# Patient Record
Sex: MALE | Race: WHITE | NOT HISPANIC OR LATINO | Employment: UNEMPLOYED | URBAN - METROPOLITAN AREA
[De-identification: names, ages, dates, MRNs, and addresses within clinical notes are randomized per-mention and may not be internally consistent; named-entity substitution may affect disease eponyms.]

---

## 2018-02-22 ENCOUNTER — APPOINTMENT (EMERGENCY)
Dept: CT IMAGING | Facility: HOSPITAL | Age: 53
End: 2018-02-22
Payer: COMMERCIAL

## 2018-02-22 ENCOUNTER — HOSPITAL ENCOUNTER (EMERGENCY)
Facility: HOSPITAL | Age: 53
Discharge: HOME | End: 2018-02-22
Attending: EMERGENCY MEDICINE | Admitting: EMERGENCY MEDICINE
Payer: COMMERCIAL

## 2018-02-22 VITALS
DIASTOLIC BLOOD PRESSURE: 83 MMHG | TEMPERATURE: 98.4 F | SYSTOLIC BLOOD PRESSURE: 143 MMHG | WEIGHT: 295 LBS | BODY MASS INDEX: 37.86 KG/M2 | HEIGHT: 74 IN | HEART RATE: 77 BPM | RESPIRATION RATE: 20 BRPM | OXYGEN SATURATION: 100 %

## 2018-02-22 DIAGNOSIS — R91.1 PULMONARY NODULE: ICD-10-CM

## 2018-02-22 DIAGNOSIS — R07.9 CHEST PAIN: Primary | ICD-10-CM

## 2018-02-22 DIAGNOSIS — M54.6 THORACIC BACK PAIN: ICD-10-CM

## 2018-02-22 DIAGNOSIS — F41.9 ANXIETY: ICD-10-CM

## 2018-02-22 DIAGNOSIS — F43.9 STRESS AT HOME: ICD-10-CM

## 2018-02-22 LAB
ALBUMIN SERPL BCP-MCNC: 3.9 G/DL (ref 3.5–5)
ALP SERPL-CCNC: 70 U/L (ref 46–116)
ALT SERPL W P-5'-P-CCNC: 49 U/L (ref 12–78)
ANION GAP SERPL CALCULATED.3IONS-SCNC: 12 MMOL/L (ref 4–13)
APTT PPP: 32 SECONDS (ref 23–35)
AST SERPL W P-5'-P-CCNC: 21 U/L (ref 5–45)
ATRIAL RATE: 74 BPM
BASOPHILS # BLD AUTO: 0.13 THOUSANDS/ΜL (ref 0–0.1)
BASOPHILS NFR BLD AUTO: 1 % (ref 0–1)
BILIRUB SERPL-MCNC: 0.4 MG/DL (ref 0.2–1)
BILIRUB UR QL STRIP: NEGATIVE
BUN SERPL-MCNC: 13 MG/DL (ref 5–25)
CALCIUM SERPL-MCNC: 9.2 MG/DL (ref 8.3–10.1)
CHLORIDE SERPL-SCNC: 103 MMOL/L (ref 100–108)
CLARITY UR: CLEAR
CO2 SERPL-SCNC: 24 MMOL/L (ref 21–32)
COLOR UR: COLORLESS
CREAT SERPL-MCNC: 1.05 MG/DL (ref 0.6–1.3)
EOSINOPHIL # BLD AUTO: 0.29 THOUSAND/ΜL (ref 0–0.61)
EOSINOPHIL NFR BLD AUTO: 2 % (ref 0–6)
ERYTHROCYTE [DISTWIDTH] IN BLOOD BY AUTOMATED COUNT: 12.1 % (ref 11.6–15.1)
GFR SERPL CREATININE-BSD FRML MDRD: 81 ML/MIN/1.73SQ M
GLUCOSE SERPL-MCNC: 105 MG/DL (ref 65–140)
GLUCOSE UR STRIP-MCNC: NEGATIVE MG/DL
HCT VFR BLD AUTO: 47.7 % (ref 36.5–49.3)
HGB BLD-MCNC: 16.6 G/DL (ref 12–17)
HGB UR QL STRIP.AUTO: NEGATIVE
INR PPP: 0.99 (ref 0.86–1.16)
KETONES UR STRIP-MCNC: NEGATIVE MG/DL
LEUKOCYTE ESTERASE UR QL STRIP: NEGATIVE
LIPASE SERPL-CCNC: 78 U/L (ref 73–393)
LYMPHOCYTES # BLD AUTO: 1.64 THOUSANDS/ΜL (ref 0.6–4.47)
LYMPHOCYTES NFR BLD AUTO: 11 % (ref 14–44)
MAGNESIUM SERPL-MCNC: 2.3 MG/DL (ref 1.6–2.6)
MCH RBC QN AUTO: 30 PG (ref 26.8–34.3)
MCHC RBC AUTO-ENTMCNC: 34.8 G/DL (ref 31.4–37.4)
MCV RBC AUTO: 86 FL (ref 82–98)
MONOCYTES # BLD AUTO: 0.86 THOUSAND/ΜL (ref 0.17–1.22)
MONOCYTES NFR BLD AUTO: 6 % (ref 4–12)
NEUTROPHILS # BLD AUTO: 11.89 THOUSANDS/ΜL (ref 1.85–7.62)
NEUTS SEG NFR BLD AUTO: 79 % (ref 43–75)
NITRITE UR QL STRIP: NEGATIVE
NRBC BLD AUTO-RTO: 0 /100 WBCS
P AXIS: 58 DEGREES
PH UR STRIP.AUTO: 6 [PH] (ref 4.5–8)
PLATELET # BLD AUTO: 354 THOUSANDS/UL (ref 149–390)
PMV BLD AUTO: 9.8 FL (ref 8.9–12.7)
POTASSIUM SERPL-SCNC: 4 MMOL/L (ref 3.5–5.3)
PR INTERVAL: 134 MS
PROT SERPL-MCNC: 7.3 G/DL (ref 6.4–8.2)
PROT UR STRIP-MCNC: NEGATIVE MG/DL
PROTHROMBIN TIME: 13.3 SECONDS (ref 12.1–14.4)
QRS AXIS: -25 DEGREES
QRSD INTERVAL: 94 MS
QT INTERVAL: 400 MS
QTC INTERVAL: 444 MS
RBC # BLD AUTO: 5.54 MILLION/UL (ref 3.88–5.62)
SODIUM SERPL-SCNC: 139 MMOL/L (ref 136–145)
SP GR UR STRIP.AUTO: <=1.005 (ref 1–1.03)
T WAVE AXIS: 52 DEGREES
TROPONIN I SERPL-MCNC: <0.02 NG/ML
TROPONIN I SERPL-MCNC: <0.02 NG/ML
UROBILINOGEN UR QL STRIP.AUTO: 0.2 E.U./DL
VENTRICULAR RATE: 74 BPM
WBC # BLD AUTO: 15.06 THOUSAND/UL (ref 4.31–10.16)

## 2018-02-22 PROCEDURE — 85730 THROMBOPLASTIN TIME PARTIAL: CPT | Performed by: EMERGENCY MEDICINE

## 2018-02-22 PROCEDURE — 83735 ASSAY OF MAGNESIUM: CPT | Performed by: EMERGENCY MEDICINE

## 2018-02-22 PROCEDURE — 99285 EMERGENCY DEPT VISIT HI MDM: CPT

## 2018-02-22 PROCEDURE — 85025 COMPLETE CBC W/AUTO DIFF WBC: CPT | Performed by: EMERGENCY MEDICINE

## 2018-02-22 PROCEDURE — 80053 COMPREHEN METABOLIC PANEL: CPT | Performed by: EMERGENCY MEDICINE

## 2018-02-22 PROCEDURE — 93010 ELECTROCARDIOGRAM REPORT: CPT | Performed by: INTERNAL MEDICINE

## 2018-02-22 PROCEDURE — 74175 CTA ABDOMEN W/CONTRAST: CPT

## 2018-02-22 PROCEDURE — 81003 URINALYSIS AUTO W/O SCOPE: CPT | Performed by: EMERGENCY MEDICINE

## 2018-02-22 PROCEDURE — 36415 COLL VENOUS BLD VENIPUNCTURE: CPT | Performed by: EMERGENCY MEDICINE

## 2018-02-22 PROCEDURE — 93005 ELECTROCARDIOGRAM TRACING: CPT

## 2018-02-22 PROCEDURE — 71275 CT ANGIOGRAPHY CHEST: CPT

## 2018-02-22 PROCEDURE — 84484 ASSAY OF TROPONIN QUANT: CPT | Performed by: EMERGENCY MEDICINE

## 2018-02-22 PROCEDURE — 83690 ASSAY OF LIPASE: CPT | Performed by: EMERGENCY MEDICINE

## 2018-02-22 PROCEDURE — 85610 PROTHROMBIN TIME: CPT | Performed by: EMERGENCY MEDICINE

## 2018-02-22 PROCEDURE — 96360 HYDRATION IV INFUSION INIT: CPT

## 2018-02-22 RX ORDER — NAPROXEN 500 MG/1
500 TABLET ORAL EVERY 12 HOURS PRN
Qty: 20 TABLET | Refills: 0 | Status: SHIPPED | OUTPATIENT
Start: 2018-02-22

## 2018-02-22 RX ORDER — ALPRAZOLAM 0.5 MG/1
0.5 TABLET ORAL ONCE
Status: COMPLETED | OUTPATIENT
Start: 2018-02-22 | End: 2018-02-22

## 2018-02-22 RX ORDER — ALBUTEROL SULFATE 90 UG/1
2 AEROSOL, METERED RESPIRATORY (INHALATION) EVERY 6 HOURS
COMMUNITY
Start: 2017-12-11

## 2018-02-22 RX ADMIN — SODIUM CHLORIDE 1000 ML: 0.9 INJECTION, SOLUTION INTRAVENOUS at 17:38

## 2018-02-22 RX ADMIN — ALPRAZOLAM 0.5 MG: 0.5 TABLET ORAL at 20:23

## 2018-02-22 RX ADMIN — IOHEXOL 100 ML: 350 INJECTION, SOLUTION INTRAVENOUS at 18:55

## 2018-02-22 NOTE — ED PROVIDER NOTES
History  Chief Complaint   Patient presents with    Chest Pain     Pt with left sided chest pain, pt actually walked into [facility]  Pt took one aspirin on his own, patient with no pain at this time  Pt does have a hx of anxiety     Patient is a 64-year-old male with past medical history of GERD, Khanna's esophagus and generalized anxiety disorder, presents to the emergency department for chest pain that he feels is secondary to stress and anxiety  Patient states he has been under great deal of stress at home, primarily because of his relationship with his wife  He is currently unemployed and relies solely on his wife for transportation and financial support  He states today he developed bilateral thoracic back pain that started to migrate into the center of his chest   He describes the pain as an ache as well as a pinching sensation  He decided to take 325 mg of aspirin and drove himself to the local EMS station which was only a mile away  His blood pressure was noted to be very elevated so they recommended he come to the ED for evaluation  He reports the chest pain has subsided but he does report a sharp sensation/pain in the left thoracic back  He denies having any associated symptoms other than the chest pain  On review of systems, he does admit to chronic sinus issues as well as cough for the past several days  He has a nebulizer machine that he uses as needed and did use it this morning  He also admits to chronic right-sided tinnitus which she has had extensive workup with a neurologist for  He denies diaphoresis, fever, chills, headache, dizziness or near syncope, hemoptysis, visual disturbance, change in hearing, palpitations, abdominal pain, nausea, vomiting, diarrhea, constipation, blood per rectum or melena, urinary symptoms, skin rash or color change, extremity swelling or pain, extremity weakness or paresthesia or other focal neurologic deficits    He reports having had a stress test about 10 years ago which was unremarkable  He has a family doctor  He states other than the stress and anxiety does not feel overly depressed however when he is speaking about his wife he is tearful on exam   He denies any suicidal homicidal ideation  He feels safe going home with his wife  He has a therapist and a psychiatrist and last saw the therapist yesterday  History provided by:  Patient   used: No    Chest Pain   Associated symptoms: back pain and cough    Associated symptoms: no abdominal pain, no diaphoresis, no dizziness, no fatigue, no fever, no headache, no nausea, no numbness, no palpitations, no shortness of breath, not vomiting and no weakness        Prior to Admission Medications   Prescriptions Last Dose Informant Patient Reported? Taking? ALPRAZolam ER (XANAX XR) 2 MG 24 hr tablet   Yes No   Sig: Take 2 mg by mouth every morning   albuterol (PROVENTIL HFA,VENTOLIN HFA) 90 mcg/act inhaler   Yes Yes   Sig: Inhale 2 puffs every 6 (six) hours   famotidine (PEPCID AC) 10 MG chewable tablet   Yes No   Sig: Chew 10 mg 2 (two) times a day   montelukast (SINGULAIR) 10 mg tablet   Yes No   Sig: Take 10 mg by mouth daily at bedtime   naproxen (NAPROSYN) 500 mg tablet   No No   Sig: Take 1 tablet by mouth 2 (two) times a day as needed for mild pain or moderate pain   omeprazole (PriLOSEC OTC) 20 MG tablet   Yes No   Sig: Take 20 mg by mouth daily      Facility-Administered Medications: None       Past Medical History:   Diagnosis Date    Generalized anxiety disorder     GERD (gastroesophageal reflux disease)        Past Surgical History:   Procedure Laterality Date    SUBLINGUAL SALIVARY CYST EXCISION         History reviewed  No pertinent family history  I have reviewed and agree with the history as documented      Social History   Substance Use Topics    Smoking status: Never Smoker    Smokeless tobacco: Never Used    Alcohol use Yes      Comment: few drinks a week Review of Systems   Constitutional: Negative for chills, diaphoresis, fatigue and fever  HENT: Positive for congestion, rhinorrhea and tinnitus  Negative for ear discharge, ear pain, hearing loss and sore throat  Eyes: Negative for photophobia, pain and visual disturbance  Respiratory: Positive for cough  Negative for chest tightness, shortness of breath and wheezing  Cardiovascular: Positive for chest pain  Negative for palpitations and leg swelling  Gastrointestinal: Negative for abdominal pain, blood in stool, constipation, diarrhea, nausea and vomiting  Genitourinary: Negative for dysuria, flank pain, frequency and hematuria  Musculoskeletal: Positive for back pain  Negative for neck pain and neck stiffness  Skin: Negative for color change, pallor, rash and wound  Allergic/Immunologic: Negative for immunocompromised state  Neurological: Negative for dizziness, syncope, weakness, light-headedness, numbness and headaches  Hematological: Negative for adenopathy  Psychiatric/Behavioral: Negative for confusion and decreased concentration  All other systems reviewed and are negative  Physical Exam  ED Triage Vitals [02/22/18 1525]   Temperature Pulse Respirations Blood Pressure SpO2   98 4 °F (36 9 °C) 76 13 (!) 179/109 96 %      Temp Source Heart Rate Source Patient Position - Orthostatic VS BP Location FiO2 (%)   Oral Monitor Lying Right arm --      Pain Score       2         Vitals:    02/22/18 1525 02/22/18 1530 02/22/18 2015 02/22/18 2016   BP: (!) 179/109 (!) 179/109 143/83 143/83   BP Location: Right arm   Right arm   Pulse: 76 87 81 77   Resp: 13 16 (!) 34 20   Temp: 98 4 °F (36 9 °C)      TempSrc: Oral      SpO2: 96% 97%  100%   Weight: 134 kg (295 lb)      Height: 6' 2" (1 88 m)        Physical Exam   Constitutional: He is oriented to person, place, and time  He appears well-developed and well-nourished  No distress  HENT:   Head: Normocephalic and atraumatic  Mouth/Throat: Oropharynx is clear and moist    Eyes: Conjunctivae and EOM are normal  Pupils are equal, round, and reactive to light  Neck: Normal range of motion  Neck supple  No JVD present  Cardiovascular: Normal rate, regular rhythm, normal heart sounds and intact distal pulses  Exam reveals no gallop and no friction rub  No murmur heard  Pulmonary/Chest: Effort normal and breath sounds normal  No respiratory distress  He has no wheezes  He has no rales  He exhibits no tenderness  Abdominal: Soft  Bowel sounds are normal  He exhibits no distension  There is no tenderness  There is no rebound and no guarding  Musculoskeletal: Normal range of motion  He exhibits no edema or tenderness  Neurological: He is alert and oriented to person, place, and time  No cranial nerve deficit  No gross motor or sensory deficits  5/5 strength throughout  Skin: Skin is warm and dry  No rash noted  He is not diaphoretic  No pallor  Psychiatric: He has a normal mood and affect  His behavior is normal    Patient appears anxious and mildly depressed  He is tearful when speaking about his stressors at home  Denies suicidal or homicidal ideation  Denies a/V hallucinations  Nursing note and vitals reviewed        ED Medications  Medications   sodium chloride 0 9 % bolus 1,000 mL (0 mL Intravenous Stopped 2/22/18 1838)   iohexol (OMNIPAQUE) 350 MG/ML injection (SINGLE-DOSE) 100 mL (100 mL Intravenous Given 2/22/18 1855)   ALPRAZolam Sarika Buys) tablet 0 5 mg (0 5 mg Oral Given 2/22/18 2023)       Diagnostic Studies  Results Reviewed     Procedure Component Value Units Date/Time    Troponin I [[id number]]  (Normal) Collected:  02/22/18 1950    Lab Status:  Final result Specimen:  Blood from Arm, Right Updated:  02/22/18 2015     Troponin I <0 02 ng/mL     Narrative:         [employer] Chemistry analyzer 99% cutoff is > 0 04 ng/mL in network labs    o cTnI 99% cutoff is useful only when applied to patients in the clinical setting of myocardial ischemia  o cTnI 99% cutoff should be interpreted in the context of clinical history, ECG findings and possibly cardiac imaging to establish correct diagnosis  o cTnI 99% cutoff may be suggestive but clearly not indicative of a coronary event without the clinical setting of myocardial ischemia  Troponin I [[id number]]  (Normal) Collected:  02/22/18 1647    Lab Status:  Final result Specimen:  Blood from Arm, Left Updated:  02/22/18 1740     Troponin I <0 02 ng/mL     Narrative:         [employer] Chemistry analyzer 99% cutoff is > 0 04 ng/mL in network labs    o cTnI 99% cutoff is useful only when applied to patients in the clinical setting of myocardial ischemia  o cTnI 99% cutoff should be interpreted in the context of clinical history, ECG findings and possibly cardiac imaging to establish correct diagnosis  o cTnI 99% cutoff may be suggestive but clearly not indicative of a coronary event without the clinical setting of myocardial ischemia  Comprehensive metabolic panel [[id number]] Collected:  02/22/18 1647    Lab Status:  Final result Specimen:  Blood from Arm, Left Updated:  02/22/18 1733     Sodium 139 mmol/L      Potassium 4 0 mmol/L      Chloride 103 mmol/L      CO2 24 mmol/L      Anion Gap 12 mmol/L      BUN 13 mg/dL      Creatinine 1 05 mg/dL      Glucose 105 mg/dL      Calcium 9 2 mg/dL      AST 21 U/L      ALT 49 U/L      Alkaline Phosphatase 70 U/L      Total Protein 7 3 g/dL      Albumin 3 9 g/dL      Total Bilirubin 0 40 mg/dL      eGFR 81 ml/min/1 73sq m     Narrative:         National Kidney Disease Education Program recommendations are as follows:  GFR calculation is accurate only with a steady state creatinine  Chronic Kidney disease less than 60 ml/min/1 73 sq  meters  Kidney failure less than 15 ml/min/1 73 sq  meters      Magnesium [[id number]]  (Normal) Collected:  02/22/18 1647    Lab Status:  Final result Specimen:  Blood from Arm, Left Updated:  02/22/18 1733 Magnesium 2 3 mg/dL     Lipase [[id number]]  (Normal) Collected:  02/22/18 1647    Lab Status:  Final result Specimen:  Blood from Arm, Left Updated:  02/22/18 1733     Lipase 78 u/L     Protime-INR [[id number]]  (Normal) Collected:  02/22/18 1647    Lab Status:  Final result Specimen:  Blood from Arm, Left Updated:  02/22/18 1729     Protime 13 3 seconds      INR 0 99    APTT [[id number]]  (Normal) Collected:  02/22/18 1647    Lab Status:  Final result Specimen:  Blood from Arm, Left Updated:  02/22/18 1729     PTT 32 seconds     Narrative:          Therapeutic Heparin Range = 60-90 seconds    CBC and differential [[id number]]  (Abnormal) Collected:  02/22/18 1647    Lab Status:  Final result Specimen:  Blood from Arm, Left Updated:  02/22/18 1707     WBC 15 06 (H) Thousand/uL      RBC 5 54 Million/uL      Hemoglobin 16 6 g/dL      Hematocrit 47 7 %      MCV 86 fL      MCH 30 0 pg      MCHC 34 8 g/dL      RDW 12 1 %      MPV 9 8 fL      Platelets 553 Thousands/uL      nRBC 0 /100 WBCs      Neutrophils Relative 79 (H) %      Lymphocytes Relative 11 (L) %      Monocytes Relative 6 %      Eosinophils Relative 2 %      Basophils Relative 1 %      Neutrophils Absolute 11 89 (H) Thousands/µL      Lymphocytes Absolute 1 64 Thousands/µL      Monocytes Absolute 0 86 Thousand/µL      Eosinophils Absolute 0 29 Thousand/µL      Basophils Absolute 0 13 (H) Thousands/µL     UA w Reflex to Microscopic [[id number]]  (Normal) Collected:  02/22/18 1653    Lab Status:  Final result Specimen:  Urine from Urine, Clean Catch Updated:  02/22/18 1707     Color, UA Colorless     Clarity, UA Clear     Specific Gravity, UA <=1 005     pH, UA 6 0     Leukocytes, UA Negative     Nitrite, UA Negative     Protein, UA Negative mg/dl      Glucose, UA Negative mg/dl      Ketones, UA Negative mg/dl      Urobilinogen, UA 0 2 E U /dl      Bilirubin, UA Negative     Blood, UA Negative                 CTA dissection protocol chest and abdomen   Final Result by Daniel Morin MD (02/22 1918)      No evidence of dissection or aneurysm  Mild greater curvature gastric wall thickening could relate to gastritis, correlate clinically  Pulmonary nodules as described above  Based on current Fleischner Society 2017 Guidelines on incidental pulmonary nodule, follow-up unenhanced CT chest in 3 months is recommended  Workstation performed: [id number]                    Procedures  ECG 12 Lead Documentation  Date/Time: 2/22/2018 4:54 PM  Performed by: Shabnam Allen  Authorized by: Shabnam Allen     ECG reviewed by me, the ED Provider: yes    Patient location:  ED  Previous ECG:     Previous ECG:  Unavailable  Rate:     ECG rate:  74    ECG rate assessment: normal    Rhythm:     Rhythm: sinus rhythm    Ectopy:     Ectopy: none    QRS:     QRS axis:  Normal    QRS intervals:  Normal  Conduction:     Conduction: normal    ST segments:     ST segments:  Normal  T waves:     T waves: normal    Comments:      Low voltage QRS complex  ECG 12 Lead Documentation  Date/Time: 2/22/2018 8:24 PM  Performed by: Juanjo Mcclure by: Shabnam Allen     ECG reviewed by me, the ED Provider: yes    Patient location:  ED  Previous ECG:     Previous ECG:  Compared to current  Rate:     ECG rate:  89    ECG rate assessment: normal    Rhythm:     Rhythm: sinus rhythm    Ectopy:     Ectopy: none    QRS:     QRS axis:  Indeterminate    QRS intervals:  Normal  Conduction:     Conduction: normal    ST segments:     ST segments:  Normal  T waves:     T waves: normal    Other findings:     Other findings: prolonged qTc interval    Comments:      Low voltage QRS  No significant change from prior although axis slightly shifted but mostly unchanged  QT slightly prolonged compared to prior EKG but still less than 500 ms  Phone Contacts  ED Phone Contact    ED Course  ED Course as of Feb 22 2038   Thu Feb 22, 2018   1806 Troponin I: <0 02 1948 Repeat /82  2023 Troponin I: <0 02 2024 Updated patient about negative workup thus far  He has an appointment with his family doctor on Monday and I advised him to follow up with his doctor regarding the pulmonary nodule seen on CT scan as well as to discuss getting another stress test as an outpatient  Discussed ED return parameters  Patient stable for discharge  MDM  Number of Diagnoses or Management Options  Diagnosis management comments: 64-year-old male presents with thoracic back pain and chest pain starting earlier this afternoon  Most likely this is stress/anxiety related however given age, obesity, will workup with cardiac labs and 3 hour delta troponin  Will also obtain a CTA of the chest and abdomen to rule out dissection  Patient already took aspirin when the pain started  Pain mild of present  Will give IV fluids  Patient does have significant stress/stressor in his life but already has a therapist and psychiatrist and is being managed outpatient  Offered to have our ED crisis worker speak with patient but he declined at this time  He is not suicidal or homicidal and I do not feel he needs further psychiatric evaluation         Amount and/or Complexity of Data Reviewed  Clinical lab tests: ordered and reviewed  Tests in the radiology section of CPT®: ordered and reviewed  Tests in the medicine section of CPT®: ordered and reviewed  Independent visualization of images, tracings, or specimens: yes      CritCare Time    Disposition  Final diagnoses:   Chest pain   Thoracic back pain   Anxiety   Stress at home   Pulmonary nodule     Time reflects when diagnosis was documented in both MDM as applicable and the Disposition within this note     Time User Action Codes Description Comment    2/22/2018  8:25 PM Aldon Saliva E Add [R07 9] Chest pain     2/22/2018  8:25 PM Aldon Saliva E Add [M54 6] Thoracic back pain     2/22/2018  8:25 PM Aldon Saliva E Add [F41 9] Anxiety 2/22/2018  8:25 PM Cezar CUEVAS Add [F43 9] Stress at home     2/22/2018  8:25 PM Cezar CUEVAS Add [R91 1] Pulmonary nodule       ED Disposition     ED Disposition Condition Comment    Discharge  Jessica Aguilera discharge to home/self care  Condition at discharge: Stable        Follow-up Information     Follow up With Specialties Details Why Contact Info Additional Information    Armen Kocher, MD  Schedule an appointment as soon as possible for a visit  [address] [place] [address]  [address] [facility] Emergency Medicine Go to If symptoms worsen [facility], [address], [place], [facility]        Patient's Medications   Discharge Prescriptions    NAPROXEN (NAPROSYN) 500 MG TABLET    Take 1 tablet (500 mg total) by mouth every 12 (twelve) hours as needed for mild pain or moderate pain       Start Date: 2/22/2018 End Date: --       Order Dose: 500 mg       Quantity: 20 tablet    Refills: 0     No discharge procedures on file      ED Provider  Electronically Signed by           Chen Sharpe DO  02/22/18 3319

## 2018-02-23 LAB
ATRIAL RATE: 89 BPM
P AXIS: 66 DEGREES
PR INTERVAL: 132 MS
QRS AXIS: -43 DEGREES
QRSD INTERVAL: 88 MS
QT INTERVAL: 396 MS
QTC INTERVAL: 481 MS
T WAVE AXIS: 58 DEGREES
VENTRICULAR RATE: 89 BPM

## 2018-02-23 PROCEDURE — 93010 ELECTROCARDIOGRAM REPORT: CPT | Performed by: INTERNAL MEDICINE

## 2018-02-23 NOTE — DISCHARGE INSTRUCTIONS
Chest Pain   WHAT YOU NEED TO KNOW:   Chest pain can be caused by a range of conditions, from not serious to life-threatening  Chest pain can be a symptom of a digestive problem, such as acid reflux or a stomach ulcer  An anxiety attack or a strong emotion, such as anger, can also cause chest pain  Infection, inflammation, or a fracture in the bones or cartilage in your chest can cause pain or discomfort  Sometimes chest pain or pressure is caused by poor blood flow to your heart (angina)  Chest pain may also be caused by life-threatening conditions such as a heart attack or blood clot in your lungs  DISCHARGE INSTRUCTIONS:   Call 911 if:   · You have any of the following signs of a heart attack:      ¨ Squeezing, pressure, or pain in your chest that lasts longer than 5 minutes or returns    ¨ Discomfort or pain in your back, neck, jaw, stomach, or arm     ¨ Trouble breathing    ¨ Nausea or vomiting    ¨ Lightheadedness or a sudden cold sweat, especially with chest pain or trouble breathing    Seek care immediately if:   · You have chest discomfort that gets worse, even with medicine  · You cough or vomit blood  · Your bowel movements are black or bloody  · You cannot stop vomiting, or it hurts to swallow  Contact your healthcare provider if:   · You have questions or concerns about your condition or care  Medicines:   · Medicines  may be given to treat the cause of your chest pain  Examples include pain medicine, anxiety medicine, or medicines to increase blood flow to your heart  · Do not take certain medicines without asking your healthcare provider first   These include NSAIDs, herbal or vitamin supplements, or hormones (estrogen or progestin)  · Take your medicine as directed  Contact your healthcare provider if you think your medicine is not helping or if you have side effects  Tell him or her if you are allergic to any medicine   Keep a list of the medicines, vitamins, and herbs you take  Include the amounts, and when and why you take them  Bring the list or the pill bottles to follow-up visits  Carry your medicine list with you in case of an emergency  Follow up with your healthcare provider within 72 hours, or as directed: You may need to return for more tests to find the cause of your chest pain  You may be referred to a specialist, such as a cardiologist or gastroenterologist  Write down your questions so you remember to ask them during your visits  Healthy living tips: The following are general healthy guidelines  If your chest pain is caused by a heart problem, your healthcare provider will give you specific guidelines to follow  · Do not smoke  Nicotine and other chemicals in cigarettes and cigars can cause lung and heart damage  Ask your healthcare provider for information if you currently smoke and need help to quit  E-cigarettes or smokeless tobacco still contain nicotine  Talk to your healthcare provider before you use these products  · Eat a variety of healthy, low-fat foods  Healthy foods include fruits, vegetables, whole-grain breads, low-fat dairy products, beans, lean meats, and fish  Ask for more information about a heart healthy diet  · Ask about activity  Your healthcare provider will tell you which activities to limit or avoid  Ask when you can drive, return to work, and have sex  Ask about the best exercise plan for you  · Maintain a healthy weight  Ask your healthcare provider how much you should weigh  Ask him or her to help you create a weight loss plan if you are overweight  © 2017 [employer] Information is for End User's use only and may not be sold, redistributed or otherwise used for commercial purposes  All illustrations and images included in CareNotes® are the copyrighted property of [employer]  or Vivek Phipps  The above information is an  only   It is not intended as medical advice for individual conditions or treatments  Talk to your doctor, nurse or pharmacist before following any medical regimen to see if it is safe and effective for you  Noncardiac Chest Pain   WHAT YOU NEED TO KNOW:   Noncardiac chest pain is pain or discomfort in your chest not caused by a heart problem  It may be caused by acid reflux, nerve or muscle problems within your esophagus, or chest wall, muscle, or rib pain  It may also be caused by panic attacks, anxiety, or depression  DISCHARGE INSTRUCTIONS:   Seek care immediately if:   · You have severe chest pain  Contact your healthcare provider if:   · Your chest pain does not get better, even with treatment  · You have questions or concerns about your condition or care  Medicines:   · Medicines  may be given to treat the cause of your chest pain  You may be given medicines to decrease pain, relieve anxiety, decrease acid reflux, or relax muscles in your esophagus  · Take your medicine as directed  Contact your healthcare provider if you think your medicine is not helping or if you have side effects  Tell him or her if you are allergic to any medicine  Keep a list of the medicines, vitamins, and herbs you take  Include the amounts, and when and why you take them  Bring the list or the pill bottles to follow-up visits  Carry your medicine list with you in case of an emergency  Cognitive therapy:  Cognitive therapy may be helpful if you have panic attacks, anxiety, or depression  It can help you change how you react to situations that tend to trigger your chest pain  Follow up with your healthcare provider as directed:  Write down your questions so you remember to ask them during your visits  © 2017 [employer]  Information is for End User's use only and may not be sold, redistributed or otherwise used for commercial purposes   All illustrations and images included in CareNotes® are the copyrighted property of A D A M [employer] Inc  or [employer]  The above information is an  only  It is not intended as medical advice for individual conditions or treatments  Talk to your doctor, nurse or pharmacist before following any medical regimen to see if it is safe and effective for you  Stress   WHAT YOU NEED TO KNOW:   Stress is a feeling of tension or strain related to the events and pressures of everyday life  Learn to cope and control your stress to help you function in a healthy way  DISCHARGE INSTRUCTIONS:   Call 911 for any of the following:   · You feel like hurting yourself or someone else  · You feel you are overwhelmed and can no longer handle things by yourself  Contact your healthcare provider if:   · You have trouble coping with your stress  · Your symptoms cause problems in your relationships  · You feel depressed  · You have trouble controlling your anger  · You have started to use alcohol, illegal drugs, or prescription medicines, or you increase your current use  · You have questions or concerns about your condition or care  Ways to manage your stress:  Learn what causes you stress  Not all stress can be avoided  Instead, change how you cope with stress by doing any of the following:  · Learn relaxation techniques, such as yoga, meditation, or listening to music  Take at least 30 minutes a day to do something you enjoy  This may include taking a bath or reading a book  · Do deep breathing exercises during times of increased stress  Sit up straight and take a slow, deep breath in through your nose  Then breathe out slowly through your mouth  Take twice as long to breathe out as you do when you breathe in  Repeat this a few times until you feel calmer or more focused  · Set realistic goals for yourself  Make a list of tasks and prioritize them  Focus on one task at a time  · Talk to someone about things that upset you  Talk to a trusted friend, family member, or support group   Try to stop yourself when you think negative, angry, or discouraging thoughts  · Take time to exercise  Start slowly, such as walking 1 to 2 blocks each day  Stretch and relax your muscles often  Ask about the best exercise plan for you  · Eat a variety of healthy foods  Healthy foods include fruits, vegetables, whole-grain breads, low-fat dairy products, beans, lean meats, and fish  Follow up with your healthcare provider as directed:  Write down your questions so you remember to ask them during your visits  © 2017 [employer]  Information is for End User's use only and may not be sold, redistributed or otherwise used for commercial purposes  All illustrations and images included in CareNotes® are the copyrighted property of A D A M [employer]  or Vivek Phipps  The above information is an  only  It is not intended as medical advice for individual conditions or treatments  Talk to your doctor, nurse or pharmacist before following any medical regimen to see if it is safe and effective for you  Anxiety   WHAT YOU SHOULD KNOW:   Anxiety is a condition that causes you to feel excessive worry, uneasiness, or fear  Family or work stress, smoking, caffeine, and alcohol can increase your risk for anxiety  Certain medicines or health conditions can also increase your risk  Anxiety may begin gradually, and can become a long-term condition if it is not managed or treated  AFTER YOU LEAVE:   Medicines:   · Medicines  can help you feel more calm and relaxed, and decrease your symptoms  · Take your medicine as directed  Contact your healthcare provider if you think your medicine is not helping or if you have side effects  Tell him if you are allergic to any medicine  Keep a list of the medicines, vitamins, and herbs you take  Include the amounts, and when and why you take them  Bring the list or the pill bottles to follow-up visits   Carry your medicine list with you in case of an emergency  Follow up with your healthcare provider within 2 weeks or as directed:  Write down your questions so you remember to ask them during your visits  Manage anxiety:   · Go to counseling as directed  Cognitive behavioral therapy can help you understand and change how you react to events that trigger your symptoms  · Find ways to manage your symptoms  Activities such as exercise, meditation, or listening to music can help you relax  · Practice deep breathing  Breathing can change how your body reacts to stress  Focus on taking slow, deep breaths several times a day, or during an anxiety attack  Breathe in through your nose, and out through your mouth  · Avoid caffeine  Caffeine can make your symptoms worse  Avoid foods or drinks that are meant to increase your energy level  · Limit or avoid alcohol  Ask your healthcare provider if alcohol is safe for you  You may not be able to drink alcohol if you take certain anxiety or depression medicines  Limit alcohol to 1 drink per day if you are a woman  Limit alcohol to 2 drinks per day if you are a man  A drink of alcohol is 12 ounces of beer, 5 ounces of wine, or 1½ ounces of liquor  Contact your healthcare provider if:   · Your symptoms get worse or do not get better with treatment  · You think your medicine may be causing side effects  · Your anxiety keeps you from doing your regular daily activities  · You have new symptoms since your last visit  · You have questions or concerns about your condition or care  Seek care immediately or call 911 if:   · You have chest pain, tightness, or heaviness that may spread to your shoulders, arms, jaw, neck, or back  · You feel like hurting yourself or someone else  · You feel dizzy, lightheaded, or faint  © 2014 [employer] is for End User's use only and may not be sold, redistributed or otherwise used for commercial purposes   All illustrations and images included in CareNotes® are the copyrighted property of A D A M [employer]  or Vivek Phipps  The above information is an  only  It is not intended as medical advice for individual conditions or treatments  Talk to your doctor, nurse or pharmacist before following any medical regimen to see if it is safe and effective for you

## 2018-06-11 ENCOUNTER — OFFICE VISIT (OUTPATIENT)
Dept: URGENT CARE | Facility: CLINIC | Age: 53
End: 2018-06-11
Payer: COMMERCIAL

## 2018-06-11 VITALS
HEART RATE: 123 BPM | DIASTOLIC BLOOD PRESSURE: 106 MMHG | OXYGEN SATURATION: 96 % | HEIGHT: 74 IN | WEIGHT: 295 LBS | RESPIRATION RATE: 16 BRPM | SYSTOLIC BLOOD PRESSURE: 124 MMHG | BODY MASS INDEX: 37.86 KG/M2 | TEMPERATURE: 97 F

## 2018-06-11 DIAGNOSIS — L02.31 ABSCESS OF BUTTOCK: Primary | ICD-10-CM

## 2018-06-11 PROCEDURE — 99213 OFFICE O/P EST LOW 20 MIN: CPT

## 2018-06-11 RX ORDER — DOXYCYCLINE 100 MG/1
100 CAPSULE ORAL 2 TIMES DAILY
Qty: 14 CAPSULE | Refills: 0 | Status: SHIPPED | OUTPATIENT
Start: 2018-06-11 | End: 2018-06-18

## 2018-10-04 ENCOUNTER — OFFICE VISIT (OUTPATIENT)
Dept: URGENT CARE | Facility: MEDICAL CENTER | Age: 53
End: 2018-10-04
Payer: COMMERCIAL

## 2018-10-04 VITALS
WEIGHT: 291 LBS | RESPIRATION RATE: 16 BRPM | HEART RATE: 92 BPM | DIASTOLIC BLOOD PRESSURE: 85 MMHG | BODY MASS INDEX: 37.36 KG/M2 | SYSTOLIC BLOOD PRESSURE: 157 MMHG

## 2018-10-04 DIAGNOSIS — L25.9 CONTACT DERMATITIS, UNSPECIFIED CONTACT DERMATITIS TYPE, UNSPECIFIED TRIGGER: Primary | ICD-10-CM

## 2018-10-04 PROCEDURE — 99213 OFFICE O/P EST LOW 20 MIN: CPT | Performed by: PHYSICIAN ASSISTANT

## 2018-10-05 NOTE — PATIENT INSTRUCTIONS
1  Use Lanolin ointment to skin 3x daily as needed for comfort  2   Consider Dermatology consult if symptoms persist

## 2018-10-05 NOTE — PROGRESS NOTES
[facility]        NAME: Loco Mckeon is a 47 y o  male  : 1965    MRN: [id number]  DATE: 2018  TIME: 9:53 PM    Assessment and Plan   Contact dermatitis, unspecified contact dermatitis type, unspecified trigger [L25 9]  1  Contact dermatitis, unspecified contact dermatitis type, unspecified trigger  white petrolatum-corn starch-lanolin (TRIPLE PASTE) 12 8 % ointment         Patient Instructions     1  Use Lanolin ointment to skin 3x daily as needed for comfort  2  Consider Dermatology consult if symptoms persist        Chief Complaint     Chief Complaint   Patient presents with    Rash     using a cream to masturbate and noticed it felt different, couple days later had itching  Noticed a rash  Realized he had a tube  of superglue next to his bed and unsure if he accidently used that  Saw PCP today who was unsure what the rash was  History of Present Illness       Patient is a 50-year-old male who presents with the pain and irritation of his skin over his genital region that increased over the past 3 days  Patient states he was using hand lotion a masturbate and he believes the lotion was altered with Super glue  Patient denies any blistering or crusting of the affected skin areas but complains of burning and irritation over the affected sites  He denies rashes on other parts of his body  Review of Systems   Review of Systems   Constitutional: Negative  HENT: Negative  Skin: Positive for rash           Current Medications       Current Outpatient Prescriptions:     albuterol (PROVENTIL HFA,VENTOLIN HFA) 90 mcg/act inhaler, Inhale 2 puffs every 6 (six) hours, Disp: , Rfl:     ALPRAZolam ER (XANAX XR) 2 MG 24 hr tablet, Take 2 mg by mouth every morning, Disp: , Rfl:     famotidine (PEPCID AC) 10 MG chewable tablet, Chew 10 mg 2 (two) times a day, Disp: , Rfl:     montelukast (SINGULAIR) 10 mg tablet, Take 10 mg by mouth daily at bedtime, Disp: , Rfl:    naproxen (NAPROSYN) 500 mg tablet, Take 1 tablet by mouth 2 (two) times a day as needed for mild pain or moderate pain, Disp: 30 tablet, Rfl: 0    naproxen (NAPROSYN) 500 mg tablet, Take 1 tablet (500 mg total) by mouth every 12 (twelve) hours as needed for mild pain or moderate pain, Disp: 20 tablet, Rfl: 0    omeprazole (PriLOSEC OTC) 20 MG tablet, Take 20 mg by mouth daily, Disp: , Rfl:     mupirocin (BACTROBAN) 2 % ointment, Apply topically 3 (three) times a day (Patient not taking: Reported on 10/4/2018 ), Disp: 22 g, Rfl: 0    white petrolatum-corn starch-lanolin (TRIPLE PASTE) 12 8 % ointment, Apply topically as needed for irritation for up to 5 days, Disp: 30 g, Rfl: 0    Current Allergies     Allergies as of 10/04/2018 - Reviewed 10/04/2018   Allergen Reaction Noted    Ranitidine Hives 09/06/2017    Sulfa antibiotics Hives 04/14/2015    Hydrochlorothiazide Rash 11/23/2016            The following portions of the patient's history were reviewed and updated as appropriate: allergies, current medications, past family history, past medical history, past social history, past surgical history and problem list      Past Medical History:   Diagnosis Date    Generalized anxiety disorder     GERD (gastroesophageal reflux disease)        Past Surgical History:   Procedure Laterality Date    SUBLINGUAL SALIVARY CYST EXCISION         No family history on file  Medications have been verified  Objective   /85 (Patient Position: Sitting)   Pulse 92   Resp 16   Wt 132 kg (291 lb)   BMI 37 36 kg/m²        Physical Exam     Physical Exam   Constitutional: He appears well-developed and well-nourished  No distress  Cardiovascular: Normal rate, regular rhythm and normal heart sounds  No murmur heard  Pulmonary/Chest: Effort normal and breath sounds normal    Abdominal: Soft  Normal appearance and bowel sounds are normal  There is no tenderness     Genitourinary:

## 2019-01-11 ENCOUNTER — OFFICE VISIT (OUTPATIENT)
Dept: URGENT CARE | Facility: CLINIC | Age: 54
End: 2019-01-11
Payer: COMMERCIAL

## 2019-01-11 VITALS
TEMPERATURE: 98.7 F | BODY MASS INDEX: 38.37 KG/M2 | SYSTOLIC BLOOD PRESSURE: 141 MMHG | OXYGEN SATURATION: 95 % | WEIGHT: 299 LBS | HEIGHT: 74 IN | DIASTOLIC BLOOD PRESSURE: 98 MMHG | HEART RATE: 85 BPM | RESPIRATION RATE: 18 BRPM

## 2019-01-11 DIAGNOSIS — J01.41 ACUTE RECURRENT PANSINUSITIS: Primary | ICD-10-CM

## 2019-01-11 PROCEDURE — 99213 OFFICE O/P EST LOW 20 MIN: CPT | Performed by: FAMILY MEDICINE

## 2019-01-11 RX ORDER — PREDNISONE 20 MG/1
TABLET ORAL
Qty: 20 TABLET | Refills: 0 | Status: SHIPPED | OUTPATIENT
Start: 2019-01-11 | End: 2019-05-16

## 2019-01-11 RX ORDER — CLARITHROMYCIN 500 MG/1
500 TABLET, COATED ORAL EVERY 12 HOURS SCHEDULED
Qty: 20 TABLET | Refills: 0 | Status: SHIPPED | OUTPATIENT
Start: 2019-01-11 | End: 2019-01-21

## 2019-01-11 NOTE — PATIENT INSTRUCTIONS
Follow up with PCP in 3-5 days  Proceed to  ER if symptoms worsen  Sinusitis   AMBULATORY CARE:   Sinusitis  is inflammation or infection of your sinuses  It is most often caused by a virus  Acute sinusitis may last up to 12 weeks  Chronic sinusitis lasts longer than 12 weeks  Recurrent sinusitis means you have 4 or more times in 1 year  Common symptoms include the following:   · Fever    · Pain, pressure, redness, or swelling around the forehead, cheeks, or eyes    · Thick yellow or green discharge from your nose    · Tenderness when you touch your face over your sinuses    · Dry cough that happens mostly at night or when you lie down    · Headache and face pain that is worse when you lean forward    · Tooth pain, or pain when you chew  Seek care immediately if:   · Your eye and eyelid are red, swollen, and painful  · You cannot open your eye  · You have vision changes, such as double vision  · Your eyeball bulges out or you cannot move your eye  · You are more sleepy than normal, or you notice changes in your ability to think, move, or talk  · You have a stiff neck, a fever, or a bad headache  · You have swelling of your forehead or scalp  Contact your healthcare provider if:   · Your symptoms do not improve after 3 days  · Your symptoms do not go away after 10 days  · You have nausea and are vomiting  · Your nose is bleeding  · You have questions or concerns about your condition or care  Treatment for sinusitis:  Your symptoms may go away on their own  Your healthcare provider may recommend watchful waiting for up to 10 days before starting antibiotics  You may  need any of the following:  · Acetaminophen  decreases pain and fever  It is available without a doctor's order  Ask how much to take and how often to take it  Follow directions   Read the labels of all other medicines you are using to see if they also contain acetaminophen, or ask your doctor or pharmacist  Acetaminophen can cause liver damage if not taken correctly  Do not use more than 4 grams (4,000 milligrams) total of acetaminophen in one day  · NSAIDs , such as ibuprofen, help decrease swelling, pain, and fever  This medicine is available with or without a doctor's order  NSAIDs can cause stomach bleeding or kidney problems in certain people  If you take blood thinner medicine, always ask your healthcare provider if NSAIDs are safe for you  Always read the medicine label and follow directions  · Nasal steroid sprays  may help decrease inflammation in your nose and sinuses  · Decongestants  help reduce swelling and drain mucus in the nose and sinuses  They may help you breathe easier  · Antihistamines  help dry mucus in the nose and relieve sneezing  · Antibiotics  help treat or prevent a bacterial infection  · Take your medicine as directed  Contact your healthcare provider if you think your medicine is not helping or if you have side effects  Tell him or her if you are allergic to any medicine  Keep a list of the medicines, vitamins, and herbs you take  Include the amounts, and when and why you take them  Bring the list or the pill bottles to follow-up visits  Carry your medicine list with you in case of an emergency  Self-care:   · Rinse your sinuses  Use a sinus rinse device to rinse your nasal passages with a saline (salt water) solution or distilled water  Do not use tap water  This will help thin the mucus in your nose and rinse away pollen and dirt  It will also help reduce swelling so you can breathe normally  Ask your healthcare provider how often to do this  · Breathe in steam   Heat a bowl of water until you see steam  Lean over the bowl and make a tent over your head with a large towel  Breathe deeply for about 20 minutes  Be careful not to get too close to the steam or burn yourself  Do this 3 times a day  You can also breathe deeply when you take a hot shower       · Sleep with your head elevated  Place an extra pillow under your head before you go to sleep to help your sinuses drain  · Drink liquids as directed  Ask your healthcare provider how much liquid to drink each day and which liquids are best for you  Liquids will thin the mucus in your nose and help it drain  Avoid drinks that contain alcohol or caffeine  · Do not smoke, and avoid secondhand smoke  Nicotine and other chemicals in cigarettes and cigars can make your symptoms worse  Ask your healthcare provider for information if you currently smoke and need help to quit  E-cigarettes or smokeless tobacco still contain nicotine  Talk to your healthcare provider before you use these products  Prevent the spread of germs that cause sinusitis:  Wash your hands often with soap and water  Wash your hands after you use the bathroom, change a child's diaper, or sneeze  Wash your hands before you prepare or eat food  Follow up with your healthcare provider as directed: You may be referred to an ear, nose, and throat specialist  Write down your questions so you remember to ask them during your visits  © 2017 [address] [facility] Information is for End User's use only and may not be sold, redistributed or otherwise used for commercial purposes  All illustrations and images included in CareNotes® are the copyrighted property of A D A M [employer]  or Vivek Phipps  The above information is an  only  It is not intended as medical advice for individual conditions or treatments  Talk to your doctor, nurse or pharmacist before following any medical regimen to see if it is safe and effective for you

## 2019-01-11 NOTE — PROGRESS NOTES
[facility]        NAME: Loco Mckeon is a 47 y o  male  : 1965    MRN: [id number]  DATE: 2019  TIME: 4:37 PM    Assessment and Plan   Acute recurrent pansinusitis [J01 41]  1  Acute recurrent pansinusitis  clarithromycin (BIAXIN) 500 mg tablet    predniSONE 20 mg tablet         Patient Instructions       Follow up with PCP in 3-5 days  Proceed to  ER if symptoms worsen  Chief Complaint     Chief Complaint   Patient presents with    Chest Pain     PT has a diated septum so has re-accuring sinus infections his sinus has been bothering him for 2 months  He now has a heavy cough , and chest pain, and body aches    Facial Pain    Cough    Sore Throat    Generalized Body Aches         History of Present Illness       Chest Pain (PT has a deviated septum so has re-accuring sinus infections his sinus has been bothering him for 2 months  He now has a heavy cough , and chest pain, and body aches)  Facial Pain   Cough   Sore Throat          Chest Pain    Associated symptoms include a cough and a fever  Cough   Associated symptoms include chills, a fever, a sore throat and wheezing  Sore Throat    Associated symptoms include congestion and coughing  Generalized Body Aches   Associated symptoms include congestion, a sore throat, fatigue, a fever, coughing and wheezing  Review of Systems   Review of Systems   Constitutional: Positive for chills, fatigue and fever  HENT: Positive for congestion, sinus pain, sinus pressure and sore throat  Respiratory: Positive for cough and wheezing            Current Medications       Current Outpatient Prescriptions:     albuterol (PROVENTIL HFA,VENTOLIN HFA) 90 mcg/act inhaler, Inhale 2 puffs every 6 (six) hours, Disp: , Rfl:     ALPRAZolam ER (XANAX XR) 2 MG 24 hr tablet, Take 2 mg by mouth every morning, Disp: , Rfl:     montelukast (SINGULAIR) 10 mg tablet, Take 10 mg by mouth daily at bedtime, Disp: , Rfl:     naproxen (NAPROSYN) 500 mg tablet, Take 1 tablet by mouth 2 (two) times a day as needed for mild pain or moderate pain, Disp: 30 tablet, Rfl: 0    naproxen (NAPROSYN) 500 mg tablet, Take 1 tablet (500 mg total) by mouth every 12 (twelve) hours as needed for mild pain or moderate pain, Disp: 20 tablet, Rfl: 0    omeprazole (PriLOSEC OTC) 20 MG tablet, Take 20 mg by mouth daily, Disp: , Rfl:     clarithromycin (BIAXIN) 500 mg tablet, Take 1 tablet (500 mg total) by mouth every 12 (twelve) hours for 10 days, Disp: 20 tablet, Rfl: 0    famotidine (PEPCID AC) 10 MG chewable tablet, Chew 10 mg 2 (two) times a day, Disp: , Rfl:     mupirocin (BACTROBAN) 2 % ointment, Apply topically 3 (three) times a day (Patient not taking: Reported on 10/4/2018 ), Disp: 22 g, Rfl: 0    predniSONE 20 mg tablet, Three tablets for 3 days, 2 tablets for 3 days, 1 tablet for 3 days, half tablet for 3-4 days  , Disp: 20 tablet, Rfl: 0    white petrolatum-corn starch-lanolin (TRIPLE PASTE) 12 8 % ointment, Apply topically as needed for irritation for up to 5 days, Disp: 30 g, Rfl: 0    Current Allergies     Allergies as of 01/11/2019 - Reviewed 01/11/2019   Allergen Reaction Noted    Ranitidine Hives 09/06/2017    Sulfa antibiotics Hives 04/14/2015    Hydrochlorothiazide Rash 11/23/2016            The following portions of the patient's history were reviewed and updated as appropriate: allergies, current medications, past family history, past medical history, past social history, past surgical history and problem list      Past Medical History:   Diagnosis Date    Generalized anxiety disorder     GERD (gastroesophageal reflux disease)        Past Surgical History:   Procedure Laterality Date    SUBLINGUAL SALIVARY CYST EXCISION         History reviewed  No pertinent family history  Medications have been verified          Objective   /98 (BP Location: Right arm, Patient Position: Sitting, Cuff Size: Large)   Pulse 85   Temp 98 7 °F (37 1 °C) (Tympanic)   Resp 18   Ht 6' 2" (1 88 m)   Wt 136 kg (299 lb)   SpO2 95%   BMI 38 39 kg/m²        Physical Exam     Physical Exam   Constitutional: He appears well-developed  He appears ill  HENT:   Right Ear: External ear normal    Left Ear: External ear normal    Nose: Right sinus exhibits maxillary sinus tenderness and frontal sinus tenderness  Left sinus exhibits maxillary sinus tenderness and frontal sinus tenderness  Mouth/Throat: Oropharynx is clear and moist  No oropharyngeal exudate  Eyes: Conjunctivae are normal    Neck: Normal range of motion  Neck supple  Cardiovascular: Normal rate, regular rhythm and normal heart sounds  No murmur heard  Pulmonary/Chest: Effort normal  No respiratory distress  He has wheezes  He has no rales  He exhibits no tenderness  Lymphadenopathy:     He has no cervical adenopathy

## 2019-05-16 ENCOUNTER — OFFICE VISIT (OUTPATIENT)
Dept: URGENT CARE | Facility: CLINIC | Age: 54
End: 2019-05-16
Payer: COMMERCIAL

## 2019-05-16 VITALS
RESPIRATION RATE: 18 BRPM | HEART RATE: 72 BPM | BODY MASS INDEX: 35.56 KG/M2 | TEMPERATURE: 96.3 F | HEIGHT: 75 IN | SYSTOLIC BLOOD PRESSURE: 140 MMHG | WEIGHT: 286 LBS | DIASTOLIC BLOOD PRESSURE: 106 MMHG | OXYGEN SATURATION: 96 %

## 2019-05-16 DIAGNOSIS — R06.2 WHEEZING: ICD-10-CM

## 2019-05-16 DIAGNOSIS — J01.00 ACUTE MAXILLARY SINUSITIS, RECURRENCE NOT SPECIFIED: Primary | ICD-10-CM

## 2019-05-16 PROCEDURE — 94640 AIRWAY INHALATION TREATMENT: CPT | Performed by: PHYSICIAN ASSISTANT

## 2019-05-16 PROCEDURE — 99213 OFFICE O/P EST LOW 20 MIN: CPT | Performed by: PHYSICIAN ASSISTANT

## 2019-05-16 RX ORDER — PREDNISONE 10 MG/1
TABLET ORAL
Qty: 26 TABLET | Refills: 0 | Status: SHIPPED | OUTPATIENT
Start: 2019-05-16

## 2019-05-16 RX ORDER — CLARITHROMYCIN 500 MG/1
500 TABLET, COATED ORAL EVERY 12 HOURS SCHEDULED
Qty: 20 TABLET | Refills: 0 | Status: SHIPPED | OUTPATIENT
Start: 2019-05-16 | End: 2019-05-26

## 2019-05-16 RX ORDER — ALBUTEROL SULFATE 90 UG/1
2 AEROSOL, METERED RESPIRATORY (INHALATION) ONCE
Qty: 18 G | Refills: 0 | Status: SHIPPED | OUTPATIENT
Start: 2019-05-16 | End: 2019-05-16

## 2019-05-16 RX ORDER — IPRATROPIUM BROMIDE AND ALBUTEROL SULFATE 2.5; .5 MG/3ML; MG/3ML
3 SOLUTION RESPIRATORY (INHALATION)
Status: DISCONTINUED | OUTPATIENT
Start: 2019-05-16 | End: 2019-05-16

## 2019-05-16 RX ORDER — IPRATROPIUM BROMIDE AND ALBUTEROL SULFATE 2.5; .5 MG/3ML; MG/3ML
3 SOLUTION RESPIRATORY (INHALATION) ONCE
Status: COMPLETED | OUTPATIENT
Start: 2019-05-16 | End: 2019-05-16

## 2019-05-16 RX ADMIN — IPRATROPIUM BROMIDE AND ALBUTEROL SULFATE 3 ML: 2.5; .5 SOLUTION RESPIRATORY (INHALATION) at 12:47

## 2019-06-16 ENCOUNTER — OFFICE VISIT (OUTPATIENT)
Dept: URGENT CARE | Facility: CLINIC | Age: 54
End: 2019-06-16
Payer: COMMERCIAL

## 2019-06-16 VITALS
BODY MASS INDEX: 35.43 KG/M2 | DIASTOLIC BLOOD PRESSURE: 98 MMHG | OXYGEN SATURATION: 96 % | TEMPERATURE: 97.3 F | WEIGHT: 285 LBS | SYSTOLIC BLOOD PRESSURE: 139 MMHG | HEIGHT: 75 IN | HEART RATE: 103 BPM | RESPIRATION RATE: 23 BRPM

## 2019-06-16 DIAGNOSIS — R06.2 WHEEZING: ICD-10-CM

## 2019-06-16 DIAGNOSIS — J01.00 ACUTE MAXILLARY SINUSITIS, RECURRENCE NOT SPECIFIED: Primary | ICD-10-CM

## 2019-06-16 PROCEDURE — 99213 OFFICE O/P EST LOW 20 MIN: CPT | Performed by: PHYSICIAN ASSISTANT

## 2019-06-16 PROCEDURE — 94640 AIRWAY INHALATION TREATMENT: CPT | Performed by: PHYSICIAN ASSISTANT

## 2019-06-16 RX ORDER — CLARITHROMYCIN 500 MG/1
500 TABLET, COATED ORAL EVERY 12 HOURS SCHEDULED
Qty: 28 TABLET | Refills: 0 | Status: SHIPPED | OUTPATIENT
Start: 2019-06-16 | End: 2019-06-30

## 2019-06-16 RX ORDER — PREDNISONE 20 MG/1
60 TABLET ORAL DAILY
Qty: 15 TABLET | Refills: 0 | Status: SHIPPED | OUTPATIENT
Start: 2019-06-16 | End: 2019-06-21

## 2019-06-16 RX ORDER — ALBUTEROL SULFATE 2.5 MG/3ML
2.5 SOLUTION RESPIRATORY (INHALATION) EVERY 6 HOURS PRN
Qty: 25 VIAL | Refills: 0 | Status: SHIPPED | OUTPATIENT
Start: 2019-06-16

## 2019-10-11 ENCOUNTER — APPOINTMENT (OUTPATIENT)
Dept: RADIOLOGY | Facility: CLINIC | Age: 54
End: 2019-10-11
Payer: COMMERCIAL

## 2019-10-11 ENCOUNTER — TRANSCRIBE ORDERS (OUTPATIENT)
Dept: ADMINISTRATIVE | Facility: HOSPITAL | Age: 54
End: 2019-10-11

## 2019-10-11 DIAGNOSIS — M79.671 RIGHT FOOT PAIN: ICD-10-CM

## 2019-10-11 DIAGNOSIS — M79.671 RIGHT FOOT PAIN: Primary | ICD-10-CM

## 2019-10-11 PROCEDURE — 73630 X-RAY EXAM OF FOOT: CPT

## 2019-10-23 ENCOUNTER — OFFICE VISIT (OUTPATIENT)
Dept: URGENT CARE | Facility: CLINIC | Age: 54
End: 2019-10-23
Payer: COMMERCIAL

## 2019-10-23 VITALS
OXYGEN SATURATION: 95 % | WEIGHT: 280 LBS | BODY MASS INDEX: 35.47 KG/M2 | SYSTOLIC BLOOD PRESSURE: 116 MMHG | RESPIRATION RATE: 20 BRPM | HEART RATE: 87 BPM | TEMPERATURE: 97.7 F | DIASTOLIC BLOOD PRESSURE: 82 MMHG

## 2019-10-23 DIAGNOSIS — R06.2 WHEEZING: ICD-10-CM

## 2019-10-23 DIAGNOSIS — J01.00 ACUTE MAXILLARY SINUSITIS, RECURRENCE NOT SPECIFIED: Primary | ICD-10-CM

## 2019-10-23 PROCEDURE — 94640 AIRWAY INHALATION TREATMENT: CPT | Performed by: PHYSICIAN ASSISTANT

## 2019-10-23 PROCEDURE — 99213 OFFICE O/P EST LOW 20 MIN: CPT | Performed by: PHYSICIAN ASSISTANT

## 2019-10-23 RX ORDER — PREDNISONE 20 MG/1
TABLET ORAL
Qty: 15 TABLET | Refills: 1 | Status: SHIPPED | OUTPATIENT
Start: 2019-10-23

## 2019-10-23 RX ORDER — ALBUTEROL SULFATE 2.5 MG/3ML
2.5 SOLUTION RESPIRATORY (INHALATION) EVERY 6 HOURS PRN
Qty: 25 VIAL | Refills: 0 | Status: SHIPPED | OUTPATIENT
Start: 2019-10-23

## 2019-10-23 RX ORDER — IPRATROPIUM BROMIDE AND ALBUTEROL SULFATE 2.5; .5 MG/3ML; MG/3ML
3 SOLUTION RESPIRATORY (INHALATION) ONCE
Status: COMPLETED | OUTPATIENT
Start: 2019-10-23 | End: 2019-10-23

## 2019-10-23 RX ORDER — ALBUTEROL SULFATE 90 UG/1
2 AEROSOL, METERED RESPIRATORY (INHALATION) EVERY 6 HOURS PRN
Qty: 18 G | Refills: 0 | Status: SHIPPED | OUTPATIENT
Start: 2019-10-23

## 2019-10-23 RX ORDER — CLARITHROMYCIN 500 MG/1
500 TABLET, COATED ORAL EVERY 12 HOURS SCHEDULED
Qty: 28 TABLET | Refills: 0 | Status: SHIPPED | OUTPATIENT
Start: 2019-10-23 | End: 2019-11-06

## 2019-10-23 RX ADMIN — IPRATROPIUM BROMIDE AND ALBUTEROL SULFATE 3 ML: 2.5; .5 SOLUTION RESPIRATORY (INHALATION) at 15:00

## 2019-10-23 NOTE — PROGRESS NOTES
[facility]        NAME: Leander Rodriguez is a 49 y o  male  : 1965    MRN: [id number]  DATE: 2019  TIME: 3:45 PM    Assessment and Plan   Acute maxillary sinusitis, recurrence not specified [J01 00]  1  Acute maxillary sinusitis, recurrence not specified     2  Wheezing  ipratropium-albuterol (DUO-NEB) 0 5-2 5 mg/3 mL inhalation solution 3 mL    predniSONE 20 mg tablet    clarithromycin (BIAXIN) 500 mg tablet    albuterol (VENTOLIN HFA) 90 mcg/act inhaler    albuterol (2 5 mg/3 mL) 0 083 % nebulizer solution         Patient Instructions     Follow up with PCP in 3-5 days  Proceed to  ER if symptoms worsen  Chief Complaint     Chief Complaint   Patient presents with    Wheezing     pt c/o sinus congestion, wheezing started over week ago    Sinusitis         History of Present Illness       60-year-old male presents for evaluation to sinus pain, pressure and wheezing  Patient states the symptoms have been on going for 7 days  Patient states he had a similar episodes in the past treated with antibiotics and steroids with much relief  Patient also does follow pulmonology as he had a history of lung nodules  States he has no history of asthma or COPD  He is taking singular daily  He does have a history of chronic sinusitis and a deviated septum  C/o productive cough of yellow sputum  Patient complains of shortness of breath with audible wheezing that has been worsening  Denies lower extremity edema  Denies chest pain  Denies hemoptysis  Review of Systems   Review of Systems   Constitutional: Negative for chills, fatigue and fever  HENT: Positive for congestion, sinus pressure and sinus pain  Negative for ear pain, postnasal drip, sore throat and trouble swallowing  Eyes: Negative for pain, discharge and redness  Respiratory: Positive for chest tightness and wheezing  Negative for shortness of breath      Cardiovascular: Negative for chest pain, palpitations and leg swelling  Gastrointestinal: Negative for abdominal pain, diarrhea, nausea and vomiting  Musculoskeletal: Negative for arthralgias, joint swelling and myalgias  Skin: Negative for rash  Neurological: Negative for dizziness, weakness, numbness and headaches           Current Medications       Current Outpatient Medications:     albuterol (2 5 mg/3 mL) 0 083 % nebulizer solution, Take 1 vial (2 5 mg total) by nebulization every 6 (six) hours as needed for wheezing or shortness of breath, Disp: 25 vial, Rfl: 0    albuterol (PROVENTIL HFA,VENTOLIN HFA) 90 mcg/act inhaler, Inhale 2 puffs every 6 (six) hours, Disp: , Rfl:     ALPRAZolam ER (XANAX XR) 2 MG 24 hr tablet, Take 2 mg by mouth every morning, Disp: , Rfl:     famotidine (PEPCID AC) 10 MG chewable tablet, Chew 10 mg 2 (two) times a day, Disp: , Rfl:     montelukast (SINGULAIR) 10 mg tablet, Take 10 mg by mouth daily at bedtime, Disp: , Rfl:     naproxen (NAPROSYN) 500 mg tablet, Take 1 tablet by mouth 2 (two) times a day as needed for mild pain or moderate pain, Disp: 30 tablet, Rfl: 0    omeprazole (PriLOSEC OTC) 20 MG tablet, Take 20 mg by mouth daily, Disp: , Rfl:     albuterol (2 5 mg/3 mL) 0 083 % nebulizer solution, Take 1 vial (2 5 mg total) by nebulization every 6 (six) hours as needed for wheezing, Disp: 25 vial, Rfl: 0    albuterol (VENTOLIN HFA) 90 mcg/act inhaler, Inhale 2 puffs every 6 (six) hours as needed for wheezing, Disp: 18 g, Rfl: 0    clarithromycin (BIAXIN) 500 mg tablet, Take 1 tablet (500 mg total) by mouth every 12 (twelve) hours for 14 days, Disp: 28 tablet, Rfl: 0    naproxen (NAPROSYN) 500 mg tablet, Take 1 tablet (500 mg total) by mouth every 12 (twelve) hours as needed for mild pain or moderate pain, Disp: 20 tablet, Rfl: 0    predniSONE 10 mg tablet, Take 3 tabs bid x 2 days, take 2 tabs bid x 2 days, take 1 tab bid x 2 days, take 1 tab daily x 2 days (Patient not taking: Reported on 6/16/2019), Disp: 26 tablet, Rfl: 0    predniSONE 20 mg tablet, Take 3 tabs po daily for 5 days, Disp: 15 tablet, Rfl: 1  No current facility-administered medications for this visit  Current Allergies     Allergies as of 10/23/2019 - Reviewed 10/23/2019   Allergen Reaction Noted    Ranitidine Hives 09/06/2017    Sulfa antibiotics Hives 04/14/2015    Hydrochlorothiazide Rash 11/23/2016            The following portions of the patient's history were reviewed and updated as appropriate: allergies, current medications, past family history, past medical history, past social history, past surgical history and problem list      Past Medical History:   Diagnosis Date    Generalized anxiety disorder     GERD (gastroesophageal reflux disease)        Past Surgical History:   Procedure Laterality Date    SUBLINGUAL SALIVARY CYST EXCISION         History reviewed  No pertinent family history  Medications have been verified  Objective   /82   Pulse 87   Temp 97 7 °F (36 5 °C)   Resp 20   Wt 127 kg (280 lb)   SpO2 95%   BMI 35 47 kg/m²        Physical Exam     Physical Exam   Constitutional: He appears well-developed and well-nourished  HENT:   Head: Normocephalic  Right Ear: Hearing and tympanic membrane normal    Left Ear: Hearing and tympanic membrane normal    Nose: No mucosal edema  Mouth/Throat: Uvula is midline and mucous membranes are normal  Posterior oropharyngeal erythema present  Cardiovascular: Normal rate and regular rhythm  Pulmonary/Chest: Effort normal  He has wheezes (diffuse wheezes )  Wheezes improved after neb tx   Nursing note and vitals reviewed

## 2020-01-01 ENCOUNTER — HOSPITAL ENCOUNTER (EMERGENCY)
Facility: HOSPITAL | Age: 55
Discharge: HOME | End: 2020-01-01
Attending: EMERGENCY MEDICINE | Admitting: EMERGENCY MEDICINE
Payer: COMMERCIAL

## 2020-01-01 VITALS
SYSTOLIC BLOOD PRESSURE: 169 MMHG | HEART RATE: 102 BPM | OXYGEN SATURATION: 96 % | TEMPERATURE: 97.8 F | RESPIRATION RATE: 18 BRPM | DIASTOLIC BLOOD PRESSURE: 105 MMHG

## 2020-01-01 DIAGNOSIS — Z76.0 MEDICATION REFILL: Primary | ICD-10-CM

## 2020-01-01 PROCEDURE — 99281 EMR DPT VST MAYX REQ PHY/QHP: CPT

## 2020-01-01 PROCEDURE — 99284 EMERGENCY DEPT VISIT MOD MDM: CPT | Performed by: EMERGENCY MEDICINE

## 2020-01-01 RX ORDER — ALBUTEROL SULFATE 90 UG/1
2 AEROSOL, METERED RESPIRATORY (INHALATION) EVERY 4 HOURS PRN
Qty: 1 INHALER | Refills: 0 | Status: SHIPPED | OUTPATIENT
Start: 2020-01-01

## 2020-01-01 RX ORDER — ALBUTEROL SULFATE 90 UG/1
2 AEROSOL, METERED RESPIRATORY (INHALATION) ONCE
Status: COMPLETED | OUTPATIENT
Start: 2020-01-01 | End: 2020-01-01

## 2020-01-01 RX ADMIN — ALBUTEROL SULFATE 2 PUFF: 90 AEROSOL, METERED RESPIRATORY (INHALATION) at 00:54

## 2020-01-01 NOTE — ED PROVIDER NOTES
History  Chief Complaint   Patient presents with    Medication Refill     Patient requesting refill on inhaler, ventolin albut  27-year-old male presents to the emergency room for medication refill  Patient states that he typically carries around an albuterol inhaler but forgot his tonight  He reports that he inhaled an allergen when he was cleaning his car this evening and became short of breath  States that he realized he did not have his albuterol inhaler  Currently denies any symptoms but is requesting an albuterol inhaler to go home with as well as a refill on his script  He denies any other symptoms  No other complaints  History provided by:  Patient  Medication Refill   Medications/supplies requested:  Albuterol inhaler   Reason for request:  Medications not available  Medications taken before: yes - see home medications        Prior to Admission Medications   Prescriptions Last Dose Informant Patient Reported? Taking?    ALPRAZolam ER (XANAX XR) 2 MG 24 hr tablet   Yes No   Sig: Take 2 mg by mouth every morning   albuterol (2 5 mg/3 mL) 0 083 % nebulizer solution   No No   Sig: Take 1 vial (2 5 mg total) by nebulization every 6 (six) hours as needed for wheezing or shortness of breath   albuterol (2 5 mg/3 mL) 0 083 % nebulizer solution   No No   Sig: Take 1 vial (2 5 mg total) by nebulization every 6 (six) hours as needed for wheezing   albuterol (PROVENTIL HFA,VENTOLIN HFA) 90 mcg/act inhaler   Yes No   Sig: Inhale 2 puffs every 6 (six) hours   albuterol (VENTOLIN HFA) 90 mcg/act inhaler   No No   Sig: Inhale 2 puffs every 6 (six) hours as needed for wheezing   famotidine (PEPCID AC) 10 MG chewable tablet   Yes No   Sig: Chew 10 mg 2 (two) times a day   montelukast (SINGULAIR) 10 mg tablet   Yes No   Sig: Take 10 mg by mouth daily at bedtime   naproxen (NAPROSYN) 500 mg tablet   No No   Sig: Take 1 tablet by mouth 2 (two) times a day as needed for mild pain or moderate pain   naproxen (NAPROSYN) 500 mg tablet   No No   Sig: Take 1 tablet (500 mg total) by mouth every 12 (twelve) hours as needed for mild pain or moderate pain   omeprazole (PriLOSEC OTC) 20 MG tablet   Yes No   Sig: Take 20 mg by mouth daily   predniSONE 10 mg tablet   No No   Sig: Take 3 tabs bid x 2 days, take 2 tabs bid x 2 days, take 1 tab bid x 2 days, take 1 tab daily x 2 days   Patient not taking: Reported on 6/16/2019   predniSONE 20 mg tablet   No No   Sig: Take 3 tabs po daily for 5 days      Facility-Administered Medications: None       Past Medical History:   Diagnosis Date    Generalized anxiety disorder     GERD (gastroesophageal reflux disease)        Past Surgical History:   Procedure Laterality Date    SUBLINGUAL SALIVARY CYST EXCISION         History reviewed  No pertinent family history  I have reviewed and agree with the history as documented  Social History     Tobacco Use    Smoking status: Never Smoker    Smokeless tobacco: Never Used   Substance Use Topics    Alcohol use: Yes     Comment: few drinks a week    Drug use: No        Review of Systems   Constitutional: Negative for chills and fever  HENT: Negative for congestion, ear pain and sore throat  Eyes: Negative for pain and visual disturbance  Respiratory: Negative for cough, shortness of breath and wheezing  Cardiovascular: Negative for chest pain and leg swelling  Gastrointestinal: Negative for abdominal pain, diarrhea, nausea and vomiting  Genitourinary: Negative for dysuria, frequency, hematuria and urgency  Musculoskeletal: Negative for neck pain and neck stiffness  Skin: Negative for rash and wound  Neurological: Negative for weakness, numbness and headaches  Psychiatric/Behavioral: Negative for agitation and confusion  All other systems reviewed and are negative  Physical Exam  Physical Exam   Constitutional: He is oriented to person, place, and time  He appears well-developed and well-nourished     HENT: Head: Normocephalic and atraumatic  Mouth/Throat: Oropharynx is clear and moist    Eyes: Pupils are equal, round, and reactive to light  EOM are normal    Neck: Normal range of motion  Neck supple  Cardiovascular: Normal rate and regular rhythm  Pulmonary/Chest: Effort normal and breath sounds normal  No stridor  No respiratory distress  He has no wheezes  He has no rales  Abdominal: Soft  Bowel sounds are normal  He exhibits no distension  There is no tenderness  Musculoskeletal: Normal range of motion  Neurological: He is alert and oriented to person, place, and time  No focal deficits   Skin: Skin is warm and dry  Nursing note and vitals reviewed  Vital Signs  ED Triage Vitals   Temperature Pulse Respirations Blood Pressure SpO2   01/01/20 0026 01/01/20 0025 01/01/20 0025 01/01/20 0026 01/01/20 0025   97 8 °F (36 6 °C) 102 18 (!) 169/105 96 %      Temp Source Heart Rate Source Patient Position - Orthostatic VS BP Location FiO2 (%)   01/01/20 0026 01/01/20 0025 -- -- --   Oral Monitor         Pain Score       --                  Vitals:    01/01/20 0025 01/01/20 0026   BP:  (!) 169/105   Pulse: 102          Visual Acuity      ED Medications  Medications   albuterol (PROVENTIL HFA,VENTOLIN HFA) inhaler 2 puff (2 puffs Inhalation Given 1/1/20 0054)       Diagnostic Studies  Results Reviewed     None                 No orders to display              Procedures  Procedures         ED Course                               MDM  Number of Diagnoses or Management Options  Medication refill: new and requires workup  Diagnosis management comments: Patient here for medication refill  Requesting albuterol inhaler  Will provide him with this as well as a script for a new medication when he runs out of this 1  Will discharge patient home  Patient reevaluated and feels improved  Discharge instructions given including medications, follow-up, and return precautions   Patient demonstrates verbal understanding and agrees with plan  Amount and/or Complexity of Data Reviewed  Clinical lab tests: ordered and reviewed  Tests in the radiology section of CPT®: ordered and reviewed  Tests in the medicine section of CPT®: ordered and reviewed  Discussion of test results with the performing providers: yes  Decide to obtain previous medical records or to obtain history from someone other than the patient: yes  Obtain history from someone other than the patient: yes  Review and summarize past medical records: yes  Discuss the patient with other providers: yes  Independent visualization of images, tracings, or specimens: yes    Patient Progress  Patient progress: improved        Disposition  Final diagnoses:   Medication refill     Time reflects when diagnosis was documented in both MDM as applicable and the Disposition within this note     Time User Action Codes Description Comment    1/1/2020 12:38 AM Elina Cuff A Add [Z76 0] Medication refill       ED Disposition     ED Disposition Condition Date/Time Comment    Discharge Stable Wed Jan 1, 2020 12:38 AM Zackery Meraz discharge to home/self care  Follow-up Information     Follow up With Specialties Details Why Contact Info Additional [address] [facility], [place] Family Medicine Call in 1 day For follow-up within 2-3 days  [address] [facility] [address] [place] [address]  [id number]  [address] [facility] Emergency Department Emergency Medicine Go to  Immediately for any new or worsening symptoms   [address] [facility] [address] ED, [address] [place], [place], [address]          Discharge Medication List as of 1/1/2020 12:39 AM      CONTINUE these medications which have NOT CHANGED    Details   !! albuterol (2 5 mg/3 mL) 0 083 % nebulizer solution Take 1 vial (2 5 mg total) by nebulization every 6 (six) hours as needed for wheezing or shortness of breath, Starting Sun 6/16/2019, Normal      !! albuterol (2 5 mg/3 mL) 0 083 % nebulizer solution Take 1 vial (2 5 mg total) by nebulization every 6 (six) hours as needed for wheezing, Starting Wed 10/23/2019, Normal      !! albuterol (PROVENTIL HFA,VENTOLIN HFA) 90 mcg/act inhaler Inhale 2 puffs every 6 (six) hours, Starting Mon 12/11/2017, Historical Med      !! albuterol (VENTOLIN HFA) 90 mcg/act inhaler Inhale 2 puffs every 6 (six) hours as needed for wheezing, Starting Wed 10/23/2019, Normal      ALPRAZolam ER (XANAX XR) 2 MG 24 hr tablet Take 2 mg by mouth every morning, Until Discontinued, Historical Med      famotidine (PEPCID AC) 10 MG chewable tablet Chew 10 mg 2 (two) times a day, Until Discontinued, Historical Med      montelukast (SINGULAIR) 10 mg tablet Take 10 mg by mouth daily at bedtime, Until Discontinued, Historical Med      !! naproxen (NAPROSYN) 500 mg tablet Take 1 tablet by mouth 2 (two) times a day as needed for mild pain or moderate pain, Starting 11/23/2016, Until Discontinued, Print      !! naproxen (NAPROSYN) 500 mg tablet Take 1 tablet (500 mg total) by mouth every 12 (twelve) hours as needed for mild pain or moderate pain, Starting Thu 2/22/2018, Print      omeprazole (PriLOSEC OTC) 20 MG tablet Take 20 mg by mouth daily, Until Discontinued, Historical Med      !! predniSONE 10 mg tablet Take 3 tabs bid x 2 days, take 2 tabs bid x 2 days, take 1 tab bid x 2 days, take 1 tab daily x 2 days, Normal      !! predniSONE 20 mg tablet Take 3 tabs po daily for 5 days, Normal       !! - Potential duplicate medications found  Please discuss with provider  No discharge procedures on file      ED Provider  Electronically Signed by           Mouna Guzman DO  01/01/20 9308

## 2020-05-15 ENCOUNTER — TELEPHONE (OUTPATIENT)
Dept: GASTROENTEROLOGY | Facility: AMBULARY SURGERY CENTER | Age: 55
End: 2020-05-15

## 2020-05-18 ENCOUNTER — TELEPHONE (OUTPATIENT)
Dept: GASTROENTEROLOGY | Facility: MEDICAL CENTER | Age: 55
End: 2020-05-18

## 2020-05-18 ENCOUNTER — TELEMEDICINE (OUTPATIENT)
Dept: GASTROENTEROLOGY | Facility: MEDICAL CENTER | Age: 55
End: 2020-05-18

## 2020-05-18 DIAGNOSIS — K59.00 CONSTIPATION, UNSPECIFIED CONSTIPATION TYPE: ICD-10-CM

## 2020-05-18 DIAGNOSIS — Z11.59 SPECIAL SCREENING EXAMINATION FOR VIRAL DISEASE: ICD-10-CM

## 2020-05-18 DIAGNOSIS — R10.31 RIGHT LOWER QUADRANT ABDOMINAL PAIN: ICD-10-CM

## 2020-05-18 DIAGNOSIS — R19.4 CHANGE IN BOWEL HABITS: Primary | ICD-10-CM

## 2020-05-18 DIAGNOSIS — R63.4 WEIGHT LOSS: ICD-10-CM

## 2020-05-18 DIAGNOSIS — K21.9 GASTROESOPHAGEAL REFLUX DISEASE WITHOUT ESOPHAGITIS: ICD-10-CM

## 2020-05-18 DIAGNOSIS — K22.70 BARRETT'S ESOPHAGUS WITHOUT DYSPLASIA: ICD-10-CM

## 2020-05-18 PROBLEM — F32.A ANXIETY AND DEPRESSION: Status: ACTIVE | Noted: 2017-11-14

## 2020-05-18 PROBLEM — J34.2 DEVIATED NASAL SEPTUM: Status: ACTIVE | Noted: 2018-06-05

## 2020-05-18 PROBLEM — F41.9 ANXIETY AND DEPRESSION: Status: ACTIVE | Noted: 2017-11-14

## 2020-05-18 PROCEDURE — G2012 BRIEF CHECK IN BY MD/QHP: HCPCS | Performed by: INTERNAL MEDICINE

## 2020-08-11 ENCOUNTER — OFFICE VISIT (OUTPATIENT)
Dept: GASTROENTEROLOGY | Facility: MEDICAL CENTER | Age: 55
End: 2020-08-11
Payer: COMMERCIAL

## 2020-08-11 VITALS
DIASTOLIC BLOOD PRESSURE: 91 MMHG | TEMPERATURE: 97.5 F | BODY MASS INDEX: 34.14 KG/M2 | HEIGHT: 74 IN | HEART RATE: 74 BPM | SYSTOLIC BLOOD PRESSURE: 140 MMHG | WEIGHT: 266 LBS

## 2020-08-11 DIAGNOSIS — K58.1 IRRITABLE BOWEL SYNDROME WITH CONSTIPATION: ICD-10-CM

## 2020-08-11 DIAGNOSIS — D12.6 SERRATED ADENOMA OF COLON: ICD-10-CM

## 2020-08-11 DIAGNOSIS — K22.70 BARRETT'S ESOPHAGUS WITHOUT DYSPLASIA: Primary | ICD-10-CM

## 2020-08-11 DIAGNOSIS — K21.9 GASTROESOPHAGEAL REFLUX DISEASE WITHOUT ESOPHAGITIS: ICD-10-CM

## 2020-08-11 PROCEDURE — 99214 OFFICE O/P EST MOD 30 MIN: CPT | Performed by: INTERNAL MEDICINE

## 2020-08-11 NOTE — PROGRESS NOTES
[facility] - Outpatient Consultation  Elaine Jaffe 48 y o  male MRN: [id number]  Encounter: [id number]          ASSESSMENT AND PLAN:      1  Khanna's esophagus without dysplasia  2  Gastroesophageal reflux disease without esophagitis  He has a history of Khanna's esophagus and his reflux has been well controlled with omeprazole daily as well as Pepcid as needed  I asked him to continue this as it seems to be helping  He is due for his next surveillance upper endoscopy in three years  3  Serrated adenoma of colon  He had serrated adenoma removed from his transverse colon so he is due for his next surveillance colonoscopy in approximately five years  4  Irritable bowel syndrome with constipation  His symptoms are likely due to constipation predominant irritable bowel syndrome  It sounds like his symptoms really worsened after his fall and may have been due to decreased mobility or decrease to bilious strain due to his pain  Another possibility is he may have had some nerve damage in his spine that have affected his ability to have regular bowel movements  I will refer him to GI physical therapy with Cammie Cowan and check an obstruction series to evaluate for significant constipation  Depending on the results I may restart him on MiraLax or Linzess  I also gave him a handout describing a low FODMAP diet which I think he may find helpful     ______________________________________________________________________    HPI:  He was in his usual state of health until January 2020 when he slipped on ice and sustained a significant injury to the right side of his body  He had severe pain on the right side of his chest and abdomen particularly in the area of his ribs  Since that time he has not had a normal return to his bowel function  He has had a decreased frequency of bowel movements and difficulty passing his stools    He feels like there are pockets of gas and stool that her retained in his colon  He also complains of bloating and distention  He had 30 pounds of weight loss but feels his weight has now stabilized  He has a history of Khanna's esophagus and reflux but said his symptoms have been well controlled on medical therapy  He had an upper endoscopy and colonoscopy performed in June 2020 and he was found to have Khanna's esophagus without dysplasia, and a serrated adenoma in the transverse colon that was removed endoscopically  He was asked repeat the endoscopy in three years in the colonoscopy in five years  REVIEW OF SYSTEMS:    CONSTITUTIONAL: Denies any fever, chills, rigors, and weight loss  HEENT: No earache or tinnitus  Denies hearing loss or visual disturbances  CARDIOVASCULAR: No chest pain or palpitations  RESPIRATORY: Denies any cough, hemoptysis, shortness of breath or dyspnea on exertion  GASTROINTESTINAL: As noted in the History of Present Illness  GENITOURINARY: No problems with urination  Denies any hematuria or dysuria  NEUROLOGIC: No dizziness or vertigo, denies headaches  MUSCULOSKELETAL: Denies any muscle or joint pain  SKIN: Denies skin rashes or itching  ENDOCRINE: Denies excessive thirst  Denies intolerance to heat or cold  PSYCHOSOCIAL: Denies depression or anxiety  Denies any recent memory loss  Historical Information   Past Medical History:   Diagnosis Date    Generalized anxiety disorder     GERD (gastroesophageal reflux disease)      Past Surgical History:   Procedure Laterality Date    SUBLINGUAL SALIVARY CYST EXCISION       Social History   Social History     Substance and Sexual Activity   Alcohol Use Yes    Comment: few drinks a week     Social History     Substance and Sexual Activity   Drug Use No     Social History     Tobacco Use   Smoking Status Never Smoker   Smokeless Tobacco Never Used     No family history on file      Meds/Allergies       Current Outpatient Medications:     albuterol (2 5 mg/3 mL) 0  083 % nebulizer solution    albuterol (2 5 mg/3 mL) 0 083 % nebulizer solution    albuterol (PROVENTIL HFA,VENTOLIN HFA) 90 mcg/act inhaler    albuterol (PROVENTIL HFA,VENTOLIN HFA) 90 mcg/act inhaler    albuterol (VENTOLIN HFA) 90 mcg/act inhaler    ALPRAZolam ER (XANAX XR) 2 MG 24 hr tablet    famotidine (PEPCID AC) 10 MG chewable tablet    montelukast (SINGULAIR) 10 mg tablet    naproxen (NAPROSYN) 500 mg tablet    naproxen (NAPROSYN) 500 mg tablet    omeprazole (PriLOSEC OTC) 20 MG tablet    predniSONE 10 mg tablet    predniSONE 20 mg tablet    Allergies   Allergen Reactions    Ranitidine Hives    Sulfa Antibiotics Hives    Hydrochlorothiazide Rash           Objective     Blood pressure 140/91, pulse 74, temperature 97 5 °F (36 4 °C), temperature source Tympanic Core, height 6' 2" (1 88 m), weight 121 kg (266 lb)  Body mass index is 34 15 kg/m²  PHYSICAL EXAM:      General Appearance:   Alert, cooperative, no distress   HEENT:   Normocephalic, atraumatic, anicteric      Neck:  Supple, symmetrical, trachea midline   Lungs:   Clear to auscultation bilaterally; no rales, rhonchi or wheezing; respirations unlabored    Heart[de-identified]   Regular rate and rhythm; no murmur, rub, or gallop  Abdomen:   Soft, mild generalized tenderness, non-distended; normal bowel sounds; no masses, no organomegaly    Genitalia:   Deferred    Rectal:   Deferred    Extremities:  No cyanosis, clubbing or edema    Pulses:  2+ and symmetric    Skin:  No jaundice, rashes, or lesions    Lymph nodes:  No palpable cervical lymphadenopathy        Lab Results:   No visits with results within 1 Day(s) from this visit     Latest known visit with results is:   Admission on 02/22/2018, Discharged on 02/22/2018   Component Date Value    Sodium 02/22/2018 139     Potassium 02/22/2018 4 0     Chloride 02/22/2018 103     CO2 02/22/2018 24     ANION GAP 02/22/2018 12     BUN 02/22/2018 13     Creatinine 02/22/2018 1 05     Glucose 02/22/2018 105     Calcium 02/22/2018 9 2     AST 02/22/2018 21     ALT 02/22/2018 49     Alkaline Phosphatase 02/22/2018 70     Total Protein 02/22/2018 7 3     Albumin 02/22/2018 3 9     Total Bilirubin 02/22/2018 0 40     eGFR 02/22/2018 81     WBC 02/22/2018 15 06*    RBC 02/22/2018 5 54     Hemoglobin 02/22/2018 16 6     Hematocrit 02/22/2018 47 7     MCV 02/22/2018 86     MCH 02/22/2018 30 0     MCHC 02/22/2018 34 8     RDW 02/22/2018 12 1     MPV 02/22/2018 9 8     Platelets 23/01/0903 354     nRBC 02/22/2018 0     Neutrophils Relative 02/22/2018 79*    Lymphocytes Relative 02/22/2018 11*    Monocytes Relative 02/22/2018 6     Eosinophils Relative 02/22/2018 2     Basophils Relative 02/22/2018 1     Neutrophils Absolute 02/22/2018 11 89*    Lymphocytes Absolute 02/22/2018 1 64     Monocytes Absolute 02/22/2018 0 86     Eosinophils Absolute 02/22/2018 0 29     Basophils Absolute 02/22/2018 0 13*    Protime 02/22/2018 13 3     INR 02/22/2018 0 99     PTT 02/22/2018 32     Magnesium 02/22/2018 2 3     Troponin I 02/22/2018 <0 02     Troponin I 02/22/2018 <0 02     Lipase 02/22/2018 78     Color, UA 02/22/2018 Colorless     Clarity, UA 02/22/2018 Clear     Specific Gravity, UA 02/22/2018 <=1 005     pH, UA 02/22/2018 6 0     Leukocytes, UA 02/22/2018 Negative     Nitrite, UA 02/22/2018 Negative     Protein, UA 02/22/2018 Negative     Glucose, UA 02/22/2018 Negative     Ketones, UA 02/22/2018 Negative     Urobilinogen, UA 02/22/2018 0 2     Bilirubin, UA 02/22/2018 Negative     Blood, UA 02/22/2018 Negative     Ventricular Rate 02/22/2018 74     Atrial Rate 02/22/2018 74     MT Interval 02/22/2018 134     QRSD Interval 02/22/2018 94     QT Interval 02/22/2018 400     QTC Interval 02/22/2018 444     P Axis 02/22/2018 58     QRS Axis 02/22/2018 -25     T Wave Axis 02/22/2018 52     Ventricular Rate 02/22/2018 89     Atrial Rate 02/22/2018 89     MT Interval 02/22/2018 132     QRSD Interval 02/22/2018 88     QT Interval 02/22/2018 396     QTC Interval 02/22/2018 481     P Axis 02/22/2018 66     QRS Axis 02/22/2018 -37     T Wave Axis 02/22/2018 58          Radiology Results:   No results found

## 2020-08-11 NOTE — LETTER
August 11, 2020     Pedro Coffman MD  [facility] [address]  [address]  [place]    Patient: Ghulam Gibbs   YOB: 1965   Date of Visit: 8/11/2020       Dear Dr Basilio Skinner:    Thank you for referring Ghulam Gibbs to me for evaluation  Below are my notes for this consultation  If you have questions, please do not hesitate to call me  I look forward to following your patient along with you  Sincerely,        Zoey Feldman MD        CC: Andria Dorantes, PT  Zoey Feldman MD  8/11/2020  2:14 PM  Sign when Signing Visit  [facility] - Outpatient Consultation  Ghulam Gibbs 48 y o  male MRN: [id number]  Encounter: [id number]          ASSESSMENT AND PLAN:      1  Khanna's esophagus without dysplasia  2  Gastroesophageal reflux disease without esophagitis  He has a history of Khanna's esophagus and his reflux has been well controlled with omeprazole daily as well as Pepcid as needed  I asked him to continue this as it seems to be helping  He is due for his next surveillance upper endoscopy in three years  3  Serrated adenoma of colon  He had serrated adenoma removed from his transverse colon so he is due for his next surveillance colonoscopy in approximately five years  4  Irritable bowel syndrome with constipation  His symptoms are likely due to constipation predominant irritable bowel syndrome  It sounds like his symptoms really worsened after his fall and may have been due to decreased mobility or decrease to bilious strain due to his pain  Another possibility is he may have had some nerve damage in his spine that have affected his ability to have regular bowel movements  I will refer him to GI physical therapy with Migel Isaac and check an obstruction series to evaluate for significant constipation  Depending on the results I may restart him on MiraLax or Linzess    I also gave him a handout describing a low FODMAP diet which I think he may find helpful     ______________________________________________________________________    HPI:  He was in his usual state of health until January 2020 when he slipped on ice and sustained a significant injury to the right side of his body  He had severe pain on the right side of his chest and abdomen particularly in the area of his ribs  Since that time he has not had a normal return to his bowel function  He has had a decreased frequency of bowel movements and difficulty passing his stools  He feels like there are pockets of gas and stool that her retained in his colon  He also complains of bloating and distention  He had 30 pounds of weight loss but feels his weight has now stabilized  He has a history of Khanna's esophagus and reflux but said his symptoms have been well controlled on medical therapy  He had an upper endoscopy and colonoscopy performed in June 2020 and he was found to have Khanna's esophagus without dysplasia, and a serrated adenoma in the transverse colon that was removed endoscopically  He was asked repeat the endoscopy in three years in the colonoscopy in five years  REVIEW OF SYSTEMS:    CONSTITUTIONAL: Denies any fever, chills, rigors, and weight loss  HEENT: No earache or tinnitus  Denies hearing loss or visual disturbances  CARDIOVASCULAR: No chest pain or palpitations  RESPIRATORY: Denies any cough, hemoptysis, shortness of breath or dyspnea on exertion  GASTROINTESTINAL: As noted in the History of Present Illness  GENITOURINARY: No problems with urination  Denies any hematuria or dysuria  NEUROLOGIC: No dizziness or vertigo, denies headaches  MUSCULOSKELETAL: Denies any muscle or joint pain  SKIN: Denies skin rashes or itching  ENDOCRINE: Denies excessive thirst  Denies intolerance to heat or cold  PSYCHOSOCIAL: Denies depression or anxiety  Denies any recent memory loss         Historical Information   Past Medical History:   Diagnosis Date  Generalized anxiety disorder     GERD (gastroesophageal reflux disease)      Past Surgical History:   Procedure Laterality Date    SUBLINGUAL SALIVARY CYST EXCISION       Social History   Social History     Substance and Sexual Activity   Alcohol Use Yes    Comment: few drinks a week     Social History     Substance and Sexual Activity   Drug Use No     Social History     Tobacco Use   Smoking Status Never Smoker   Smokeless Tobacco Never Used     No family history on file  Meds/Allergies       Current Outpatient Medications:     albuterol (2 5 mg/3 mL) 0 083 % nebulizer solution    albuterol (2 5 mg/3 mL) 0 083 % nebulizer solution    albuterol (PROVENTIL HFA,VENTOLIN HFA) 90 mcg/act inhaler    albuterol (PROVENTIL HFA,VENTOLIN HFA) 90 mcg/act inhaler    albuterol (VENTOLIN HFA) 90 mcg/act inhaler    ALPRAZolam ER (XANAX XR) 2 MG 24 hr tablet    famotidine (PEPCID AC) 10 MG chewable tablet    montelukast (SINGULAIR) 10 mg tablet    naproxen (NAPROSYN) 500 mg tablet    naproxen (NAPROSYN) 500 mg tablet    omeprazole (PriLOSEC OTC) 20 MG tablet    predniSONE 10 mg tablet    predniSONE 20 mg tablet    Allergies   Allergen Reactions    Ranitidine Hives    Sulfa Antibiotics Hives    Hydrochlorothiazide Rash           Objective     Blood pressure 140/91, pulse 74, temperature 97 5 °F (36 4 °C), temperature source Tympanic Core, height 6' 2" (1 88 m), weight 121 kg (266 lb)  Body mass index is 34 15 kg/m²  PHYSICAL EXAM:      General Appearance:   Alert, cooperative, no distress   HEENT:   Normocephalic, atraumatic, anicteric      Neck:  Supple, symmetrical, trachea midline   Lungs:   Clear to auscultation bilaterally; no rales, rhonchi or wheezing; respirations unlabored    Heart[de-identified]   Regular rate and rhythm; no murmur, rub, or gallop     Abdomen:   Soft, mild generalized tenderness, non-distended; normal bowel sounds; no masses, no organomegaly    Genitalia:   Deferred    Rectal:   Deferred    Extremities:  No cyanosis, clubbing or edema    Pulses:  2+ and symmetric    Skin:  No jaundice, rashes, or lesions    Lymph nodes:  No palpable cervical lymphadenopathy        Lab Results:   No visits with results within 1 Day(s) from this visit     Latest known visit with results is:   Admission on 02/22/2018, Discharged on 02/22/2018   Component Date Value    Sodium 02/22/2018 139     Potassium 02/22/2018 4 0     Chloride 02/22/2018 103     CO2 02/22/2018 24     ANION GAP 02/22/2018 12     BUN 02/22/2018 13     Creatinine 02/22/2018 1 05     Glucose 02/22/2018 105     Calcium 02/22/2018 9 2     AST 02/22/2018 21     ALT 02/22/2018 49     Alkaline Phosphatase 02/22/2018 70     Total Protein 02/22/2018 7 3     Albumin 02/22/2018 3 9     Total Bilirubin 02/22/2018 0 40     eGFR 02/22/2018 81     WBC 02/22/2018 15 06*    RBC 02/22/2018 5 54     Hemoglobin 02/22/2018 16 6     Hematocrit 02/22/2018 47 7     MCV 02/22/2018 86     MCH 02/22/2018 30 0     MCHC 02/22/2018 34 8     RDW 02/22/2018 12 1     MPV 02/22/2018 9 8     Platelets 70/27/0871 354     nRBC 02/22/2018 0     Neutrophils Relative 02/22/2018 79*    Lymphocytes Relative 02/22/2018 11*    Monocytes Relative 02/22/2018 6     Eosinophils Relative 02/22/2018 2     Basophils Relative 02/22/2018 1     Neutrophils Absolute 02/22/2018 11 89*    Lymphocytes Absolute 02/22/2018 1 64     Monocytes Absolute 02/22/2018 0 86     Eosinophils Absolute 02/22/2018 0 29     Basophils Absolute 02/22/2018 0 13*    Protime 02/22/2018 13 3     INR 02/22/2018 0 99     PTT 02/22/2018 32     Magnesium 02/22/2018 2 3     Troponin I 02/22/2018 <0 02     Troponin I 02/22/2018 <0 02     Lipase 02/22/2018 78     Color, UA 02/22/2018 Colorless     Clarity, UA 02/22/2018 Clear     Specific Gravity, UA 02/22/2018 <=1 005     pH, UA 02/22/2018 6 0     Leukocytes, UA 02/22/2018 Negative     Nitrite, UA 02/22/2018 Negative     Protein, UA 02/22/2018 Negative     Glucose, UA 02/22/2018 Negative     Ketones, UA 02/22/2018 Negative     Urobilinogen, UA 02/22/2018 0 2     Bilirubin, UA 02/22/2018 Negative     Blood, UA 02/22/2018 Negative     Ventricular Rate 02/22/2018 74     Atrial Rate 02/22/2018 74     [place] Interval 02/22/2018 134     QRSD Interval 02/22/2018 94     QT Interval 02/22/2018 400     QTC Interval 02/22/2018 444     P Axis 02/22/2018 58     QRS Axis 02/22/2018 -25     T Wave Axis 02/22/2018 52     Ventricular Rate 02/22/2018 89     Atrial Rate 02/22/2018 89     [place] Interval 02/22/2018 132     QRSD Interval 02/22/2018 88     QT Interval 02/22/2018 396     QTC Interval 02/22/2018 481     P Axis 02/22/2018 66     QRS Axis 02/22/2018 -37     T Wave Axis 02/22/2018 58          Radiology Results:   No results found

## 2020-10-20 ENCOUNTER — OFFICE VISIT (OUTPATIENT)
Dept: GASTROENTEROLOGY | Facility: MEDICAL CENTER | Age: 55
End: 2020-10-20
Payer: COMMERCIAL

## 2020-10-20 VITALS
WEIGHT: 265 LBS | BODY MASS INDEX: 34.01 KG/M2 | HEIGHT: 74 IN | SYSTOLIC BLOOD PRESSURE: 133 MMHG | DIASTOLIC BLOOD PRESSURE: 88 MMHG | TEMPERATURE: 98 F | HEART RATE: 82 BPM

## 2020-10-20 DIAGNOSIS — K62.5 RECTAL BLEEDING: ICD-10-CM

## 2020-10-20 DIAGNOSIS — K63.5 POLYP OF COLON, UNSPECIFIED PART OF COLON, UNSPECIFIED TYPE: ICD-10-CM

## 2020-10-20 DIAGNOSIS — K21.9 GASTROESOPHAGEAL REFLUX DISEASE WITHOUT ESOPHAGITIS: Primary | ICD-10-CM

## 2020-10-20 DIAGNOSIS — K22.70 BARRETT'S ESOPHAGUS WITHOUT DYSPLASIA: ICD-10-CM

## 2020-10-20 DIAGNOSIS — R14.0 ABDOMINAL BLOATING: ICD-10-CM

## 2020-10-20 PROCEDURE — 99214 OFFICE O/P EST MOD 30 MIN: CPT | Performed by: INTERNAL MEDICINE

## 2020-10-20 RX ORDER — POLYETHYLENE GLYCOL 3350, SODIUM CHLORIDE, SODIUM BICARBONATE, POTASSIUM CHLORIDE 420; 11.2; 5.72; 1.48 G/4L; G/4L; G/4L; G/4L
4000 POWDER, FOR SOLUTION ORAL ONCE
Qty: 4000 ML | Refills: 0 | Status: SHIPPED | OUTPATIENT
Start: 2020-10-20 | End: 2020-10-20

## 2020-10-26 ENCOUNTER — TELEPHONE (OUTPATIENT)
Dept: GASTROENTEROLOGY | Facility: AMBULARY SURGERY CENTER | Age: 55
End: 2020-10-26

## 2020-10-28 ENCOUNTER — TELEPHONE (OUTPATIENT)
Dept: GASTROENTEROLOGY | Facility: CLINIC | Age: 55
End: 2020-10-28

## 2020-11-19 ENCOUNTER — TELEPHONE (OUTPATIENT)
Dept: GASTROENTEROLOGY | Facility: CLINIC | Age: 55
End: 2020-11-19

## 2020-12-04 ENCOUNTER — TRANSCRIBE ORDERS (OUTPATIENT)
Dept: GASTROENTEROLOGY | Facility: CLINIC | Age: 55
End: 2020-12-04

## 2020-12-17 ENCOUNTER — ANESTHESIA EVENT (OUTPATIENT)
Dept: GASTROENTEROLOGY | Facility: HOSPITAL | Age: 55
End: 2020-12-17

## 2020-12-17 RX ORDER — SODIUM CHLORIDE 9 MG/ML
125 INJECTION, SOLUTION INTRAVENOUS CONTINUOUS
Status: CANCELLED | OUTPATIENT
Start: 2020-12-17

## 2020-12-17 RX ORDER — LIDOCAINE HYDROCHLORIDE 10 MG/ML
0.5 INJECTION, SOLUTION EPIDURAL; INFILTRATION; INTRACAUDAL; PERINEURAL ONCE AS NEEDED
Status: CANCELLED | OUTPATIENT
Start: 2020-12-17

## 2020-12-18 ENCOUNTER — HOSPITAL ENCOUNTER (OUTPATIENT)
Dept: GASTROENTEROLOGY | Facility: HOSPITAL | Age: 55
Setting detail: OUTPATIENT SURGERY
Discharge: HOME | End: 2020-12-18
Attending: INTERNAL MEDICINE | Admitting: INTERNAL MEDICINE
Payer: COMMERCIAL

## 2020-12-18 ENCOUNTER — ANESTHESIA (OUTPATIENT)
Dept: GASTROENTEROLOGY | Facility: HOSPITAL | Age: 55
End: 2020-12-18

## 2020-12-18 VITALS
SYSTOLIC BLOOD PRESSURE: 113 MMHG | HEART RATE: 73 BPM | OXYGEN SATURATION: 97 % | WEIGHT: 264 LBS | TEMPERATURE: 98 F | RESPIRATION RATE: 18 BRPM | DIASTOLIC BLOOD PRESSURE: 78 MMHG | HEIGHT: 74 IN | BODY MASS INDEX: 33.88 KG/M2

## 2020-12-18 VITALS — HEART RATE: 75 BPM

## 2020-12-18 DIAGNOSIS — K62.5 RECTAL BLEEDING: ICD-10-CM

## 2020-12-18 DIAGNOSIS — R14.0 ABDOMINAL BLOATING: ICD-10-CM

## 2020-12-18 PROCEDURE — 88305 TISSUE EXAM BY PATHOLOGIST: CPT | Performed by: PATHOLOGY

## 2020-12-18 PROCEDURE — 45380 COLONOSCOPY AND BIOPSY: CPT | Performed by: INTERNAL MEDICINE

## 2020-12-18 RX ORDER — PROPOFOL 10 MG/ML
INJECTION, EMULSION INTRAVENOUS AS NEEDED
Status: DISCONTINUED | OUTPATIENT
Start: 2020-12-18 | End: 2020-12-18

## 2020-12-18 RX ORDER — SODIUM CHLORIDE 9 MG/ML
INJECTION, SOLUTION INTRAVENOUS CONTINUOUS PRN
Status: DISCONTINUED | OUTPATIENT
Start: 2020-12-18 | End: 2020-12-18

## 2020-12-18 RX ADMIN — PROPOFOL 30 MG: 10 INJECTION, EMULSION INTRAVENOUS at 08:14

## 2020-12-18 RX ADMIN — PROPOFOL 30 MG: 10 INJECTION, EMULSION INTRAVENOUS at 08:20

## 2020-12-18 RX ADMIN — PROPOFOL 150 MG: 10 INJECTION, EMULSION INTRAVENOUS at 08:11

## 2020-12-18 RX ADMIN — PROPOFOL 30 MG: 10 INJECTION, EMULSION INTRAVENOUS at 08:28

## 2020-12-18 RX ADMIN — SODIUM CHLORIDE: 0.9 INJECTION, SOLUTION INTRAVENOUS at 08:08

## 2020-12-18 RX ADMIN — PROPOFOL 30 MG: 10 INJECTION, EMULSION INTRAVENOUS at 08:17

## 2020-12-18 RX ADMIN — PROPOFOL 30 MG: 10 INJECTION, EMULSION INTRAVENOUS at 08:24

## 2021-10-27 ENCOUNTER — TELEPHONE (OUTPATIENT)
Dept: OTHER | Facility: OTHER | Age: 56
End: 2021-10-27

## 2021-12-11 ENCOUNTER — HOSPITAL ENCOUNTER (EMERGENCY)
Facility: HOSPITAL | Age: 56
Discharge: HOME | End: 2021-12-11
Attending: EMERGENCY MEDICINE
Payer: COMMERCIAL

## 2021-12-11 VITALS
SYSTOLIC BLOOD PRESSURE: 129 MMHG | DIASTOLIC BLOOD PRESSURE: 96 MMHG | TEMPERATURE: 97.8 F | OXYGEN SATURATION: 96 % | RESPIRATION RATE: 20 BRPM | HEART RATE: 88 BPM

## 2021-12-11 DIAGNOSIS — R09.89 GLOBUS SENSATION: ICD-10-CM

## 2021-12-11 DIAGNOSIS — R19.8 SENSATION OF FOREIGN BODY IN ESOPHAGUS: Primary | ICD-10-CM

## 2021-12-11 PROCEDURE — 99283 EMERGENCY DEPT VISIT LOW MDM: CPT

## 2021-12-11 PROCEDURE — 99284 EMERGENCY DEPT VISIT MOD MDM: CPT | Performed by: EMERGENCY MEDICINE

## 2021-12-11 RX ADMIN — Medication 10 ML: at 22:18

## 2022-01-10 ENCOUNTER — TELEPHONE (OUTPATIENT)
Dept: GASTROENTEROLOGY | Facility: CLINIC | Age: 57
End: 2022-01-10

## 2022-01-10 NOTE — TELEPHONE ENCOUNTER
Spoke with pt and began to explain that he hasn't been seen in over one year and that a capsule endo was not mentioned  I explained that a OV would be pertinent to discuss options  Pt having GI issues which he associates with his enormous amount of stress  Pt expressed he has been living in a hotel for last two years as hes going through a divorce after 25 years of marriage  Pt discussed having COVID twice and the first time he almost   However, pt is unvaccinated and does not trust in the vaccine regardless of his ex working for the company that makes the vaccine  Pt attempted to discuss vaccine politics but I quickly switched conversation  All this he states has caused him to have significant stress and has caused GI issues like diarrhea, rectal bleeding, and abdominal pain  Symptoms are intermittent  I expressed pt should have OV and he is agreeable  Pt only wants to see Dr Krys Raymundo

## 2022-01-10 NOTE — TELEPHONE ENCOUNTER
Patients GI provider:  Dr Alfredo Jasmine    Number to return call: [phone]    Reason for call: Pt calling stating he would like to have a capsule endoscopy       Scheduled procedure/appointment date if applicable: N/A

## 2022-01-19 ENCOUNTER — TELEPHONE (OUTPATIENT)
Dept: GASTROENTEROLOGY | Facility: CLINIC | Age: 57
End: 2022-01-19

## 2022-01-19 ENCOUNTER — TELEMEDICINE (OUTPATIENT)
Dept: GASTROENTEROLOGY | Facility: CLINIC | Age: 57
End: 2022-01-19
Payer: COMMERCIAL

## 2022-01-19 DIAGNOSIS — K22.70 BARRETT'S ESOPHAGUS WITHOUT DYSPLASIA: ICD-10-CM

## 2022-01-19 DIAGNOSIS — K21.9 GASTROESOPHAGEAL REFLUX DISEASE WITHOUT ESOPHAGITIS: ICD-10-CM

## 2022-01-19 DIAGNOSIS — K63.5 POLYP OF COLON, UNSPECIFIED PART OF COLON, UNSPECIFIED TYPE: ICD-10-CM

## 2022-01-19 DIAGNOSIS — R14.0 ABDOMINAL BLOATING: ICD-10-CM

## 2022-01-19 DIAGNOSIS — K62.5 RECTAL BLEEDING: Primary | ICD-10-CM

## 2022-01-19 PROCEDURE — 99214 OFFICE O/P EST MOD 30 MIN: CPT | Performed by: INTERNAL MEDICINE

## 2022-01-19 NOTE — PROGRESS NOTES
Virtual Regular Visit    Verification of patient location:    Patient is located in the following state in which I hold an active license [place]      Assessment/Plan:    Problem List Items Addressed This Visit        Digestive    Khanna's esophagus    Gastroesophageal reflux disease without esophagitis    Polyp of colon    Rectal bleeding - Primary       Other    Abdominal bloating        1  Rectal bleeding  His rectal bleeding is most likely due to hemorrhoids and I suggested Anusol or Preparation-H  At his request I will schedule a capsule endoscopy to rule out a small-bowel source of bleeding although I assured him this is most likely hemorrhoids given his history  - Capsule endoscopy; Future    2  Polyp of colon, unspecified part of colon, unspecified type  He has a history of colon polyps and is due for his next surveillance colonoscopy in 2025  3  Abdominal bloating  4  Gastroesophageal reflux disease without esophagitis  5  Khanna's esophagus without dysplasia  He has a history of Khanna's esophagus and is due for his next upper endoscopy in 2023  I encouraged him to continue a low FODMAP diet for his bloating symptoms  Reason for visit is   Chief Complaint   Patient presents with    Virtual Regular Visit        Encounter provider Dimitiros Hawley MD    Provider located at [address] [place] [address] [address] [place] [address] 76  [phone]      Recent Visits  No visits were found meeting these conditions  Showing recent visits within past 7 days and meeting all other requirements  Future Appointments  No visits were found meeting these conditions  Showing future appointments within next 150 days and meeting all other requirements       The patient was identified by name and date of birth   Scotkarin Jadon was informed that this is a telemedicine visit and that the visit is being conducted through [address] [place] [address] and patient was informed that this is a secure, HIPAA-compliant platform  He agrees to proceed     My office door was closed  No one else was in the room  He acknowledged consent and understanding of privacy and security of the video platform  The patient has agreed to participate and understands they can discontinue the visit at any time  Patient is aware this is a billable service  Subjective  Blanche Guo is a 65 y o  male presents for follow-up of his reflux, Khanna's esophagus, colon polyps, bloating, and rectal bleeding  He had a colonoscopy in 2021 that revealed internal hemorrhoids and diverticulosis  Repeat colonoscopy was recommended in five years  His last endoscopy was in 2020 and repeat was recommended in three years because of his Khanna's esophagus  He feels his reflux has been well controlled but he has had some recent rectal bleeding  He denies melena, abdominal pain, and weight loss  He said the bleeding was one episode and it resolved on its own  He said he is interested in getting a capsule endoscopy performed since he has not had this before        Past Medical History:   Diagnosis Date    Anxiety     Asthma     Generalized anxiety disorder     GERD (gastroesophageal reflux disease)        Past Surgical History:   Procedure Laterality Date    SUBLINGUAL SALIVARY CYST EXCISION         Current Outpatient Medications   Medication Sig Dispense Refill    albuterol (2 5 mg/3 mL) 0 083 % nebulizer solution Take 1 vial (2 5 mg total) by nebulization every 6 (six) hours as needed for wheezing or shortness of breath 25 vial 0    albuterol (2 5 mg/3 mL) 0 083 % nebulizer solution Take 1 vial (2 5 mg total) by nebulization every 6 (six) hours as needed for wheezing 25 vial 0    albuterol (PROVENTIL HFA,VENTOLIN HFA) 90 mcg/act inhaler Inhale 2 puffs every 6 (six) hours      albuterol (PROVENTIL HFA,VENTOLIN HFA) 90 mcg/act inhaler Inhale 2 puffs every 4 (four) hours as needed for wheezing 1 Inhaler 0    albuterol (VENTOLIN HFA) 90 mcg/act inhaler Inhale 2 puffs every 6 (six) hours as needed for wheezing 18 g 0    ALPRAZolam ER (XANAX XR) 2 MG 24 hr tablet Take 2 mg by mouth every morning      famotidine (PEPCID AC) 10 MG chewable tablet Chew 10 mg 2 (two) times a day      montelukast (SINGULAIR) 10 mg tablet Take 10 mg by mouth daily at bedtime      naproxen (NAPROSYN) 500 mg tablet Take 1 tablet by mouth 2 (two) times a day as needed for mild pain or moderate pain 30 tablet 0    naproxen (NAPROSYN) 500 mg tablet Take 1 tablet (500 mg total) by mouth every 12 (twelve) hours as needed for mild pain or moderate pain 20 tablet 0    omeprazole (PriLOSEC OTC) 20 MG tablet Take 20 mg by mouth daily      polyethylene glycol-electrolytes (TriLyte) 4000 mL solution Take 4,000 mL by mouth once for 1 dose 4000 mL 0    predniSONE 10 mg tablet Take 3 tabs bid x 2 days, take 2 tabs bid x 2 days, take 1 tab bid x 2 days, take 1 tab daily x 2 days 26 tablet 0    predniSONE 20 mg tablet Take 3 tabs po daily for 5 days 15 tablet 1     No current facility-administered medications for this visit  Allergies   Allergen Reactions    Ranitidine Hives    Sulfa Antibiotics Hives    Hydrochlorothiazide Rash       REVIEW OF SYSTEMS:    CONSTITUTIONAL: Denies any fever, chills, rigors, and weight loss  HEENT: No earache or tinnitus  Denies hearing loss or visual disturbances  CARDIOVASCULAR: No palpitations, but he has chest pain so he went to the ED  RESPIRATORY: Denies any cough, hemoptysis, shortness of breath or dyspnea on exertion  GASTROINTESTINAL: As noted in the History of Present Illness  GENITOURINARY: No problems with urination  Denies any hematuria or dysuria  NEUROLOGIC: No dizziness or vertigo, denies headaches  MUSCULOSKELETAL: Denies any muscle or joint pain  SKIN: Denies skin rashes or itching     ENDOCRINE: Denies excessive thirst  Denies intolerance to heat or cold   PSYCHOSOCIAL: Denies depression or anxiety  Denies any recent memory loss  PHYSICAL EXAMINATION:  Appearance and vitals taken from home devices    General Appearance:   Alert, cooperative, no distress   HEENT:  Normocephalic, atraumatic, anicteric  Neck supple, symmetrical, trachea midline  Lungs:   Equal chest rise and unlabored breathing, normal effort, no coughing  Cardiovascular:   No visualized JVD  Abdomen:   No abdominal distension  Skin:   No jaundice, rashes, or lesions  Musculoskeletal:   Normal range of motion visualized  Psych:  Normal affect and normal insight  Neuro:  Alert and appropriate  I spent 15 minutes directly with the patient during this visit    VIRTUAL VISIT DISCLAIMER      Mirna Watson verbally agrees to participate in [employer]  Pt is aware that [employer] could be limited without vital signs or the ability to perform a full hands-on physical Shaun Hayward understands he or the provider may request at any time to terminate the video visit and request the patient to seek care or treatment in person

## 2022-01-24 ENCOUNTER — TELEPHONE (OUTPATIENT)
Dept: GASTROENTEROLOGY | Facility: CLINIC | Age: 57
End: 2022-01-24

## 2022-01-24 NOTE — TELEPHONE ENCOUNTER
Jemima Mcmahon is denying patients Capsule "The member has hemorrhoids, capsule endoscopy is not medically necessary at this time "    Peer to peer can be done within 5 days of denial letter date which is (1/21/2022 Friday night)  Please call [phone]  Case ID: [id number]  Member ID: [id number]    Thank you!

## 2022-03-01 ENCOUNTER — TELEPHONE (OUTPATIENT)
Dept: OTHER | Facility: OTHER | Age: 57
End: 2022-03-01

## 2022-03-01 NOTE — TELEPHONE ENCOUNTER
Patient called in regard to his appointment on 3/2   Patient appointment was cancelled per his request  He would like a call back to reschedule a virtual visit appointment

## 2022-03-02 ENCOUNTER — TELEPHONE (OUTPATIENT)
Dept: GASTROENTEROLOGY | Facility: CLINIC | Age: 57
End: 2022-03-02

## 2022-03-02 NOTE — TELEPHONE ENCOUNTER
Patient had an OV on 3/2 with Dr Calrk Payne  Patient canceled appointment  He requested a virtual appointment with Dr Clark Payne  Called patient and [facility] regarding scheduling a virtual visit appointment with Dr Clark Payne

## 2022-03-04 ENCOUNTER — TELEPHONE (OUTPATIENT)
Dept: GASTROENTEROLOGY | Facility: CLINIC | Age: 57
End: 2022-03-04

## 2022-03-04 NOTE — TELEPHONE ENCOUNTER
Patient had a OV appnt on 3/2 but pt canceled his appnt on MY Chart  He requested to schedule a virtual  F/U  Called patient regarding scheduling a virtual F/U with Dr Julia Rogers  Patient said that he spoke to Dr Julia Rogers  Patient said it is not necessary for him at this time to schedule the virtual appointment

## 2022-04-25 ENCOUNTER — HOSPITAL ENCOUNTER (EMERGENCY)
Facility: HOSPITAL | Age: 57
Discharge: HOME | End: 2022-04-25
Attending: EMERGENCY MEDICINE | Admitting: EMERGENCY MEDICINE
Payer: COMMERCIAL

## 2022-04-25 ENCOUNTER — APPOINTMENT (EMERGENCY)
Dept: CT IMAGING | Facility: HOSPITAL | Age: 57
End: 2022-04-25
Payer: COMMERCIAL

## 2022-04-25 VITALS
SYSTOLIC BLOOD PRESSURE: 159 MMHG | RESPIRATION RATE: 19 BRPM | WEIGHT: 292.7 LBS | HEART RATE: 79 BPM | HEIGHT: 74 IN | BODY MASS INDEX: 37.57 KG/M2 | TEMPERATURE: 97.3 F | OXYGEN SATURATION: 98 % | DIASTOLIC BLOOD PRESSURE: 91 MMHG

## 2022-04-25 DIAGNOSIS — Q89.2 ECTOPIC THYROID TISSUE: ICD-10-CM

## 2022-04-25 DIAGNOSIS — R09.89 GLOBUS SENSATION: Primary | ICD-10-CM

## 2022-04-25 LAB
ALBUMIN SERPL BCP-MCNC: 4.4 G/DL (ref 3–5.2)
ALP SERPL-CCNC: 64 U/L (ref 43–122)
ALT SERPL W P-5'-P-CCNC: 45 U/L
ANION GAP SERPL CALCULATED.3IONS-SCNC: 9 MMOL/L (ref 5–14)
AST SERPL W P-5'-P-CCNC: 38 U/L (ref 17–59)
BASOPHILS # BLD AUTO: 0.08 THOUSANDS/ΜL (ref 0–0.1)
BASOPHILS NFR BLD AUTO: 1 % (ref 0–1)
BILIRUB SERPL-MCNC: 0.82 MG/DL
BUN SERPL-MCNC: 7 MG/DL (ref 5–25)
CALCIUM SERPL-MCNC: 9.1 MG/DL (ref 8.4–10.2)
CHLORIDE SERPL-SCNC: 108 MMOL/L (ref 97–108)
CO2 SERPL-SCNC: 22 MMOL/L (ref 22–30)
CREAT SERPL-MCNC: 1.08 MG/DL (ref 0.7–1.5)
EOSINOPHIL # BLD AUTO: 0.36 THOUSAND/ΜL (ref 0–0.61)
EOSINOPHIL NFR BLD AUTO: 4 % (ref 0–6)
ERYTHROCYTE [DISTWIDTH] IN BLOOD BY AUTOMATED COUNT: 12.8 % (ref 11.6–15.1)
GFR SERPL CREATININE-BSD FRML MDRD: 76 ML/MIN/1.73SQ M
GLUCOSE SERPL-MCNC: 105 MG/DL (ref 70–99)
HCT VFR BLD AUTO: 52.9 % (ref 36.5–49.3)
HGB BLD-MCNC: 18.1 G/DL (ref 12–17)
IMM GRANULOCYTES # BLD AUTO: 0.03 THOUSAND/UL (ref 0–0.2)
IMM GRANULOCYTES NFR BLD AUTO: 0 % (ref 0–2)
LYMPHOCYTES # BLD AUTO: 1.54 THOUSANDS/ΜL (ref 0.6–4.47)
LYMPHOCYTES NFR BLD AUTO: 19 % (ref 14–44)
MCH RBC QN AUTO: 30.3 PG (ref 26.8–34.3)
MCHC RBC AUTO-ENTMCNC: 34.2 G/DL (ref 31.4–37.4)
MCV RBC AUTO: 89 FL (ref 82–98)
MONOCYTES # BLD AUTO: 0.73 THOUSAND/ΜL (ref 0.17–1.22)
MONOCYTES NFR BLD AUTO: 9 % (ref 4–12)
NEUTROPHILS # BLD AUTO: 5.48 THOUSANDS/ΜL (ref 1.85–7.62)
NEUTS SEG NFR BLD AUTO: 67 % (ref 43–75)
NRBC BLD AUTO-RTO: 0 /100 WBCS
PLATELET # BLD AUTO: 334 THOUSANDS/UL (ref 149–390)
PMV BLD AUTO: 9.3 FL (ref 8.9–12.7)
POTASSIUM SERPL-SCNC: 4 MMOL/L (ref 3.6–5)
PROT SERPL-MCNC: 7.4 G/DL (ref 5.9–8.4)
RBC # BLD AUTO: 5.98 MILLION/UL (ref 3.88–5.62)
SODIUM SERPL-SCNC: 139 MMOL/L (ref 137–147)
WBC # BLD AUTO: 8.22 THOUSAND/UL (ref 4.31–10.16)

## 2022-04-25 PROCEDURE — 96374 THER/PROPH/DIAG INJ IV PUSH: CPT

## 2022-04-25 PROCEDURE — 70491 CT SOFT TISSUE NECK W/DYE: CPT

## 2022-04-25 PROCEDURE — 99284 EMERGENCY DEPT VISIT MOD MDM: CPT | Performed by: PHYSICIAN ASSISTANT

## 2022-04-25 PROCEDURE — 96361 HYDRATE IV INFUSION ADD-ON: CPT

## 2022-04-25 PROCEDURE — 99284 EMERGENCY DEPT VISIT MOD MDM: CPT

## 2022-04-25 PROCEDURE — G1004 CDSM NDSC: HCPCS

## 2022-04-25 PROCEDURE — 85025 COMPLETE CBC W/AUTO DIFF WBC: CPT | Performed by: PHYSICIAN ASSISTANT

## 2022-04-25 PROCEDURE — 80053 COMPREHEN METABOLIC PANEL: CPT | Performed by: PHYSICIAN ASSISTANT

## 2022-04-25 PROCEDURE — 36415 COLL VENOUS BLD VENIPUNCTURE: CPT | Performed by: PHYSICIAN ASSISTANT

## 2022-04-25 RX ORDER — LORAZEPAM 2 MG/ML
2 INJECTION INTRAMUSCULAR ONCE
Status: COMPLETED | OUTPATIENT
Start: 2022-04-25 | End: 2022-04-25

## 2022-04-25 RX ADMIN — IOHEXOL 85 ML: 350 INJECTION, SOLUTION INTRAVENOUS at 17:46

## 2022-04-25 RX ADMIN — SODIUM CHLORIDE 1000 ML: 0.9 INJECTION, SOLUTION INTRAVENOUS at 16:32

## 2022-04-25 RX ADMIN — LORAZEPAM 2 MG: 2 INJECTION INTRAMUSCULAR; INTRAVENOUS at 16:32

## 2022-04-25 NOTE — ED NOTES
Patient transported to [facility] Helena Obrien, [facility]  04/25/22 5709

## 2022-04-25 NOTE — ED PROVIDER NOTES
History  Chief Complaint   Patient presents with    Foreign Body in Throat     states Friday night  feels like some food item stuck in throat; states has been coughing up bits of food since  ENT appointment not available right away  Patient is a 66 y/o male, presenting for a globus sensation in his throat x 3 days after he ate pizza  Patient reports he has perforated his esophagus before, states he was concerned due to this history and states he has been able to swallow water however is having pain and pressure  Patient reports he contacted his GI provider as he has barretts esophagus and has had multiple endoscopies previously however no available appointments thus prompting patient to present here for evaluation  Patient reports no nausea or vomiting  No respiratory distress  History provided by:  Patient   used: No    Foreign Body in Throat  Location:  Swallowed  Pain quality:  Pressure  Pain severity:  Moderate  Timing:  Constant  Progression:  Unchanged  Chronicity:  New  Worsened by:  Nothing  Ineffective treatments:  None tried  Associated symptoms: choking    Associated symptoms: no abdominal pain, no congestion, no ear pain, no nausea, no rhinorrhea, no sore throat and no vomiting        Prior to Admission Medications   Prescriptions Last Dose Informant Patient Reported? Taking?    ALPRAZolam ER (XANAX XR) 2 MG 24 hr tablet   Yes No   Sig: Take 2 mg by mouth every morning   albuterol (2 5 mg/3 mL) 0 083 % nebulizer solution   No No   Sig: Take 1 vial (2 5 mg total) by nebulization every 6 (six) hours as needed for wheezing or shortness of breath   albuterol (2 5 mg/3 mL) 0 083 % nebulizer solution   No No   Sig: Take 1 vial (2 5 mg total) by nebulization every 6 (six) hours as needed for wheezing   albuterol (PROVENTIL HFA,VENTOLIN HFA) 90 mcg/act inhaler   Yes No   Sig: Inhale 2 puffs every 6 (six) hours   albuterol (PROVENTIL HFA,VENTOLIN HFA) 90 mcg/act inhaler   No No Sig: Inhale 2 puffs every 4 (four) hours as needed for wheezing   albuterol (VENTOLIN HFA) 90 mcg/act inhaler   No No   Sig: Inhale 2 puffs every 6 (six) hours as needed for wheezing   famotidine (PEPCID AC) 10 MG chewable tablet   Yes No   Sig: Chew 10 mg 2 (two) times a day   montelukast (SINGULAIR) 10 mg tablet   Yes No   Sig: Take 10 mg by mouth daily at bedtime   naproxen (NAPROSYN) 500 mg tablet   No No   Sig: Take 1 tablet by mouth 2 (two) times a day as needed for mild pain or moderate pain   naproxen (NAPROSYN) 500 mg tablet   No No   Sig: Take 1 tablet (500 mg total) by mouth every 12 (twelve) hours as needed for mild pain or moderate pain   omeprazole (PriLOSEC OTC) 20 MG tablet   Yes No   Sig: Take 20 mg by mouth daily   polyethylene glycol-electrolytes (TriLyte) 4000 mL solution   No No   Sig: Take 4,000 mL by mouth once for 1 dose   predniSONE 10 mg tablet   No No   Sig: Take 3 tabs bid x 2 days, take 2 tabs bid x 2 days, take 1 tab bid x 2 days, take 1 tab daily x 2 days   predniSONE 20 mg tablet   No No   Sig: Take 3 tabs po daily for 5 days      Facility-Administered Medications: None       Past Medical History:   Diagnosis Date    Anxiety     Asthma     Generalized anxiety disorder     GERD (gastroesophageal reflux disease)        Past Surgical History:   Procedure Laterality Date    SUBLINGUAL SALIVARY CYST EXCISION         History reviewed  No pertinent family history  I have reviewed and agree with the history as documented  E-Cigarette/Vaping    E-Cigarette Use Never User      E-Cigarette/Vaping Substances     Social History     Tobacco Use    Smoking status: Never Smoker    Smokeless tobacco: Never Used   Vaping Use    Vaping Use: Never used   Substance Use Topics    Alcohol use: Yes     Comment: few drinks a week    Drug use: No       Review of Systems   Constitutional: Negative for chills, fatigue and fever     HENT: Negative for congestion, ear pain, rhinorrhea and sore throat  Eyes: Negative for redness  Respiratory: Positive for choking  Negative for chest tightness and shortness of breath  Cardiovascular: Negative for chest pain and palpitations  Gastrointestinal: Negative for abdominal pain, nausea and vomiting  Genitourinary: Negative for dysuria and hematuria  Musculoskeletal: Negative  Skin: Negative for rash  Neurological: Negative for dizziness, syncope, light-headedness and numbness  Physical Exam  Physical Exam  Vitals and nursing note reviewed  Constitutional:       Appearance: Normal appearance  He is well-developed  HENT:      Head: Normocephalic and atraumatic  Mouth/Throat:      Mouth: Mucous membranes are moist       Pharynx: Oropharynx is clear  Eyes:      General: No scleral icterus  Pupils: Pupils are equal, round, and reactive to light  Cardiovascular:      Rate and Rhythm: Normal rate and regular rhythm  Pulses: Normal pulses  Pulmonary:      Effort: Pulmonary effort is normal  No respiratory distress  Breath sounds: No stridor  Abdominal:      General: There is no distension  Palpations: There is no mass  Musculoskeletal:      Cervical back: Normal range of motion  Skin:     General: Skin is warm and dry  Capillary Refill: Capillary refill takes less than 2 seconds  Coloration: Skin is not jaundiced  Neurological:      Mental Status: He is alert and oriented to person, place, and time        Gait: Gait normal    Psychiatric:         Mood and Affect: Mood normal          Vital Signs  ED Triage Vitals [04/25/22 1540]   Temperature Pulse Respirations Blood Pressure SpO2   (!) 97 3 °F (36 3 °C) 73 16 (!) 173/118 96 %      Temp Source Heart Rate Source Patient Position - Orthostatic VS BP Location FiO2 (%)   Oral Monitor Sitting Left arm --      Pain Score       3           Vitals:    04/25/22 1540 04/25/22 1813   BP: (!) 173/118 164/89   Pulse: 73 82   Patient Position - Orthostatic VS: Sitting Lying         Visual Acuity      ED Medications  Medications   sodium chloride 0 9 % bolus 1,000 mL (0 mL Intravenous Stopped 4/25/22 1832)   LORazepam (ATIVAN) injection 2 mg (2 mg Intravenous Given 4/25/22 1632)   iohexol (OMNIPAQUE) 350 MG/ML injection (SINGLE-DOSE) 100 mL (85 mL Intravenous Given 4/25/22 1746)       Diagnostic Studies  Results Reviewed     Procedure Component Value Units Date/Time    Comprehensive metabolic panel [[id number]]  (Abnormal) Collected: 04/25/22 1630    Lab Status: Final result Specimen: Blood from Arm, Right Updated: 04/25/22 1731     Sodium 139 mmol/L      Potassium 4 0 mmol/L      Chloride 108 mmol/L      CO2 22 mmol/L      ANION GAP 9 mmol/L      BUN 7 mg/dL      Creatinine 1 08 mg/dL      Glucose 105 mg/dL      Calcium 9 1 mg/dL      AST 38 U/L      ALT 45 U/L      Alkaline Phosphatase 64 U/L      Total Protein 7 4 g/dL      Albumin 4 4 g/dL      Total Bilirubin 0 82 mg/dL      eGFR 76 ml/min/1 73sq m     Narrative:      [place] guidelines for Chronic Kidney Disease (CKD):     Stage 1 with normal or high GFR (GFR > 90 mL/min/1 73 square meters)    Stage 2 Mild CKD (GFR = 60-89 mL/min/1 73 square meters)    Stage 3A Moderate CKD (GFR = 45-59 mL/min/1 73 square meters)    Stage 3B Moderate CKD (GFR = 30-44 mL/min/1 73 square meters)    Stage 4 Severe CKD (GFR = 15-29 mL/min/1 73 square meters)    Stage 5 End Stage CKD (GFR <15 mL/min/1 73 square meters)  Note: GFR calculation is accurate only with a steady state creatinine    CBC and differential [[id number]]  (Abnormal) Collected: 04/25/22 1630    Lab Status: Final result Specimen: Blood from Arm, Right Updated: 04/25/22 1646     WBC 8 22 Thousand/uL      RBC 5 98 Million/uL      Hemoglobin 18 1 g/dL      Hematocrit 52 9 %      MCV 89 fL      MCH 30 3 pg      MCHC 34 2 g/dL      RDW 12 8 %      MPV 9 3 fL      Platelets 278 Thousands/uL      nRBC 0 /100 WBCs      Neutrophils Relative 67 % Immat GRANS % 0 %      Lymphocytes Relative 19 %      Monocytes Relative 9 %      Eosinophils Relative 4 %      Basophils Relative 1 %      Neutrophils Absolute 5 48 Thousands/µL      Immature Grans Absolute 0 03 Thousand/uL      Lymphocytes Absolute 1 54 Thousands/µL      Monocytes Absolute 0 73 Thousand/µL      Eosinophils Absolute 0 36 Thousand/µL      Basophils Absolute 0 08 Thousands/µL                  CT soft tissue neck with contrast   Final Result by Sofy Grossman MD (04/25 1912)      No evidence of foreign body, mass or inflammation in the neck  Workstation performed: [id number]                    Procedures  Procedures         ED Course  ED Course as of 04/25/22 1941 Mon Apr 25, 2022 1929      No evidence of foreign body, mass or inflammation in the neck  1934 Patient was reexamined at this time and informed of laboratory and/or imaging results and was found to be stable for discharge  Return to emergency department criteria was reviewed with the patient who verbalized understanding and was agreeable to discharge and the treatment plan at this time  SBIRT 20yo+      Most Recent Value   SBIRT (24 yo +)    In order to provide better care to our patients, we are screening all of our patients for alcohol and drug use  Would it be okay to ask you these screening questions? No Filed at: 04/25/2022 1640                    MDM  Number of Diagnoses or Management Options  Ectopic thyroid tissue  Globus sensation  Diagnosis management comments: All imaging and/or lab testing discussed with patient, strict return to ED precautions discussed  Patient recommended to follow up promptly with appropriate outpatient provider  Patient and/or family members verbalizes understanding and agrees with plan  Patient is stable for discharge      Portions of the record may have been created with voice recognition software   Occasional wrong word or "sound a like" substitutions may have occurred due to the inherent limitations of voice recognition software  Read the chart carefully and recognize, using context, where substitutions have occurred  Disposition  Final diagnoses:   Globus sensation   Ectopic thyroid tissue     Time reflects when diagnosis was documented in both MDM as applicable and the Disposition within this note     Time User Action Codes Description Comment    4/25/2022  7:39 PM Sami Pettis Add [R09 89] Globus sensation     4/25/2022  7:39 PM Sami Pettis Add [Q89 2] Ectopic thyroid tissue       ED Disposition     ED Disposition Condition Date/Time Comment    Discharge Stable Mon Apr 25, 2022  7:39 PM Emma Torre discharge to home/self care  Follow-up Information     Follow up With Specialties Details Why Contact Info Additional [address] [facility] Schedule an appointment as soon as possible for a visit  As needed [address] [facility] [address]  [facility] [address]  Bishnu Reid [address] [facility], [address] [facility], [facility], [address] [facility] [address] [phone]          Patient's Medications   Discharge Prescriptions    DIPHENHYDRAMINE (BENADRYL) 12 5 MG/5 ML ORAL LIQUID    Take 10 mL (25 mg total) by mouth 4 (four) times a day as needed (sore throat / anxiety) for up to 10 days       Start Date: 4/25/2022 End Date: 5/5/2022       Order Dose: 25 mg       Quantity: 120 mL    Refills: 0       No discharge procedures on file      PDMP Review     None          ED Provider  Electronically Signed by           Oliver Torres PA-C  04/25/22 1942

## 2022-04-25 NOTE — ED NOTES
Returned from [address]  Ambulated to restroom on unit without difficulty        Hayden Carlton, SAMANTHA  04/25/22 2264

## 2022-04-27 ENCOUNTER — TELEPHONE (OUTPATIENT)
Dept: GASTROENTEROLOGY | Facility: CLINIC | Age: 57
End: 2022-04-27

## 2022-04-27 NOTE — TELEPHONE ENCOUNTER
----- Message from Alfredo Peter MD sent at 4/27/2022 11:26 AM EDT -----  Regarding: Reschedule office visit  Please reschedule his office visit with me some time in the next 6 weeks  Ok to [employer] if I don't have any openings

## 2022-05-24 NOTE — PROGRESS NOTES
[facility]        NAME: Emery Wilson is a 47 y o  male  : 1965    MRN: [id number]  DATE: 2018  TIME: 6:40 PM    Assessment and Plan   Abscess of buttock [L02 31]  1  Abscess of buttock  mupirocin (BACTROBAN) 2 % ointment    doxycycline monohydrate (MONODOX) 100 mg capsule         Patient Instructions     Continue warm compresses  Follow up with PCP in 3-5 days  Proceed to  ER if symptoms worsen  Chief Complaint     Chief Complaint   Patient presents with    Abscess     possible abscess to R upper buttocks area x 1 week  was larger, but the color has changed  denies any fevers  History of Present Illness        21-year-old male complains of right buttock abscess for 1 week  He scratched it and got a little bit of pus out  No other treatment  He was trying warm compresses  No fever or chills           Review of Systems   Review of Systems      Current Medications       Current Outpatient Prescriptions:     albuterol (PROVENTIL HFA,VENTOLIN HFA) 90 mcg/act inhaler, Inhale 2 puffs every 6 (six) hours, Disp: , Rfl:     ALPRAZolam ER (XANAX XR) 2 MG 24 hr tablet, Take 2 mg by mouth every morning, Disp: , Rfl:     famotidine (PEPCID AC) 10 MG chewable tablet, Chew 10 mg 2 (two) times a day, Disp: , Rfl:     montelukast (SINGULAIR) 10 mg tablet, Take 10 mg by mouth daily at bedtime, Disp: , Rfl:     naproxen (NAPROSYN) 500 mg tablet, Take 1 tablet by mouth 2 (two) times a day as needed for mild pain or moderate pain, Disp: 30 tablet, Rfl: 0    naproxen (NAPROSYN) 500 mg tablet, Take 1 tablet (500 mg total) by mouth every 12 (twelve) hours as needed for mild pain or moderate pain, Disp: 20 tablet, Rfl: 0    omeprazole (PriLOSEC OTC) 20 MG tablet, Take 20 mg by mouth daily, Disp: , Rfl:     doxycycline monohydrate (MONODOX) 100 mg capsule, Take 1 capsule (100 mg total) by mouth 2 (two) times a day for 7 days, Disp: 14 capsule, Rfl: 0    mupirocin (BACTROBAN) 2 % ointment, Apply topically 3 (three) times a day, Disp: 22 g, Rfl: 0    Current Allergies     Allergies as of 06/11/2018 - Reviewed 06/11/2018   Allergen Reaction Noted    Ranitidine Hives 09/06/2017    Sulfa antibiotics Hives 04/14/2015    Hydrochlorothiazide Rash 11/23/2016            The following portions of the patient's history were reviewed and updated as appropriate: allergies, current medications, past family history, past medical history, past social history, past surgical history and problem list      Past Medical History:   Diagnosis Date    Generalized anxiety disorder     GERD (gastroesophageal reflux disease)        Past Surgical History:   Procedure Laterality Date    SUBLINGUAL SALIVARY CYST EXCISION         No family history on file  Medications have been verified  Objective   BP (!) 124/106 (BP Location: Left arm, Patient Position: Standing, Cuff Size: Large)   Pulse (!) 123   Temp (!) 97 °F (36 1 °C) (Temporal)   Resp 16   Ht 6' 2" (1 88 m)   Wt 134 kg (295 lb)   SpO2 96%   BMI 37 88 kg/m²        Physical Exam     Physical Exam   Constitutional: He appears well-developed and well-nourished  Skin:    Right superior buttock with small 1 cm area of erythema  He has 2 cm of induration but no fluctuance or   Purulence  Mildly tender  No warmth  Patient informed/Explanation of wait/Pillow